# Patient Record
Sex: FEMALE | Race: BLACK OR AFRICAN AMERICAN | URBAN - METROPOLITAN AREA
[De-identification: names, ages, dates, MRNs, and addresses within clinical notes are randomized per-mention and may not be internally consistent; named-entity substitution may affect disease eponyms.]

---

## 2017-03-21 RX ORDER — COLCHICINE 0.6 MG/1
0.6 TABLET ORAL DAILY
Qty: 30 TAB | Refills: 11 | Status: SHIPPED | OUTPATIENT
Start: 2017-03-21 | End: 2017-03-22 | Stop reason: SDUPTHER

## 2017-03-22 RX ORDER — COLCHICINE 0.6 MG/1
0.6 TABLET ORAL DAILY
Qty: 30 TAB | Refills: 11 | Status: SHIPPED | OUTPATIENT
Start: 2017-03-22 | End: 2019-02-12 | Stop reason: SDUPTHER

## 2017-03-27 ENCOUNTER — OFFICE VISIT (OUTPATIENT)
Dept: INTERNAL MEDICINE CLINIC | Age: 77
End: 2017-03-27

## 2017-03-27 VITALS
WEIGHT: 100.7 LBS | HEIGHT: 63 IN | OXYGEN SATURATION: 95 % | SYSTOLIC BLOOD PRESSURE: 133 MMHG | DIASTOLIC BLOOD PRESSURE: 69 MMHG | HEART RATE: 74 BPM | TEMPERATURE: 98 F | RESPIRATION RATE: 18 BRPM | BODY MASS INDEX: 17.84 KG/M2

## 2017-03-27 DIAGNOSIS — I10 ESSENTIAL HYPERTENSION: ICD-10-CM

## 2017-03-27 DIAGNOSIS — J44.9 CHRONIC OBSTRUCTIVE PULMONARY DISEASE, UNSPECIFIED COPD TYPE (HCC): Primary | ICD-10-CM

## 2017-03-27 DIAGNOSIS — E03.2 HYPOTHYROIDISM DUE TO MEDICATION: ICD-10-CM

## 2017-03-27 DIAGNOSIS — E78.5 DYSLIPIDEMIA: ICD-10-CM

## 2017-03-27 DIAGNOSIS — M10.9 GOUT, UNSPECIFIED CAUSE, UNSPECIFIED CHRONICITY, UNSPECIFIED SITE: ICD-10-CM

## 2017-03-27 NOTE — PROGRESS NOTES
[facility]  [place] [address]  [address] [place] [id number]  Phone:  [phone]  Fax: [phone]       Chief Complaint   Patient presents with    Hypertension   . SUBJECTIVE:    Tala Mendez is a 67 y.o. female comes in for return visit stating that she has done well. She could not get her Colchicine filled. I am not entirely sure why she is taking this on a daily basis other than obviously for gout but why this was settled upon. I suspect she was on Allopurinol and stopped taking it and continued taking the Colchicine. She has not had any attacks. Unfortunately she continues to smoke cigarettes. She has been taking her antihypertensive medication as prescribed as is the case with her statin in view of her increased cardiovascular risk. She is also compliant with her thyroid supplement. Current Outpatient Prescriptions   Medication Sig Dispense Refill    levothyroxine (SYNTHROID) 50 mcg tablet TAKE 1 TABLET ON AN EMPTY STOMACH IN THE MORNING ONCE A DAY ORALLY 30 Tab 11    pravastatin (PRAVACHOL) 40 mg tablet TAKE 1 TABLET AT BEDTIME 30 Tab 11    losartan (COZAAR) 50 mg tablet Take 1 Tab by mouth daily. 30 Tab 11    amLODIPine (NORVASC) 10 mg tablet Take 1 Tab by mouth daily. 30 Tab 11    allopurinol (ZYLOPRIM) 100 mg tablet Take 1 Tab by mouth daily. 30 Tab 11    colchicine 0.6 mg tablet Take 1 Tab by mouth daily.  27 Tab 11     Past Medical History:   Diagnosis Date    Hypercholesterolemia     Hypertension     Thyroid disease      Past Surgical History:   Procedure Laterality Date    HX COLONOSCOPY      HX GYN       No Known Allergies      REVIEW OF SYSTEMS:  General: negative for - chills or fever  ENT: negative for - headaches, nasal congestion or tinnitus  Respiratory: negative for - cough, hemoptysis, shortness of breath or wheezing  Cardiovascular : negative for - chest pain, edema, palpitations or shortness of breath  Gastrointestinal: negative for - abdominal pain, blood in stools, heartburn or nausea/vomiting  Genito-Urinary: no dysuria, trouble voiding, or hematuria  Musculoskeletal: negative for - gait disturbance, joint pain, joint stiffness or joint swelling  Neurological: no TIA or stroke symptoms  Hematologic: no bruises, no bleeding, no swollen glands  Integument: no lumps, mole changes, nail changes or rash  Endocrine: no malaise/lethargy or unexpected weight changes      Social History     Social History    Marital status: UNKNOWN     Spouse name: N/A    Number of children: 2    Years of education: N/A     Occupational History    retired--long-term work      Social History Main Topics    Smoking status: Current Every Day Smoker    Smokeless tobacco: Never Used    Alcohol use No    Drug use: No    Sexual activity: Not Currently     Other Topics Concern    None     Social History Narrative     Family History   Problem Relation Age of Onset    Cancer Paternal Grandfather        OBJECTIVE:    Visit Vitals    /69 (BP 1 Location: Left arm, BP Patient Position: Sitting)    Pulse 74    Temp 98 °F (36.7 °C) (Oral)    Resp 18    Ht 5' 3\" (1.6 m)    Wt 100 lb 11.2 oz (45.7 kg)    SpO2 95%    BMI 17.84 kg/m2     CONSTITUTIONAL: well , well nourished, appears age appropriate  EYES: perrla, eom intact  ENMT:moist mucous membranes, pharynx clear  NECK: supple. Thyroid normal  RESPIRATORY: Chest: clear to ascultation and percussion   CARDIOVASCULAR: Heart: regular rate and rhythm  GASTROINTESTINAL: Abdomen: soft, bowel sounds active  HEMATOLOGIC: no pathological lymph nodes palpated  MUSCULOSKELETAL: Extremities: no edema, pulse 1+   INTEGUMENT: No unusual rashes or suspicious skin lesions noted. Nails appear normal.  NEUROLOGIC: non-focal exam   MENTAL STATUS: alert and oriented, appropriate affect      ASSESSMENT:  1. Chronic obstructive pulmonary disease, unspecified COPD type ([facility] [facility]    2.  Essential hypertension    3. Dyslipidemia    4. Hypothyroidism due to medication    5. Gout, unspecified cause, unspecified chronicity, unspecified site        PLAN:    1. She has mild COPD and I encourage her to discontinue cigarette smoking. 2. Blood pressure is excellent today. No adjustments are made. 3. Her last ApoB level was reasonable. No adjustments are made for now. 4. Thyroid status will be assessed for efficacy and compliance. 5. I will decide about appropriate treatment for her presumed gout once I get a uric acid back. .  Orders Placed This Encounter    METABOLIC PANEL, BASIC    URIC ACID         Follow-up Disposition:  Return in about 4 months (around 7/27/2017).       Noemi Barnhart MD

## 2017-03-27 NOTE — MR AVS SNAPSHOT
Visit Information Date & Time Provider Department Dept. Phone Encounter #  
 3/27/2017  9:00 AM Patricia Henao MD [facility] [phone] [id number] Follow-up Instructions Return in about 4 months (around 7/27/2017). Upcoming Health Maintenance Date Due INFLUENZA AGE 9 TO ADULT 12/5/2017* Pneumococcal 65+ Low/Medium Risk (1 of 2 - PCV13) 12/7/2017* DTaP/Tdap/Td series (1 - Tdap) 12/7/2017* GLAUCOMA SCREENING Q2Y 6/15/2017 MEDICARE YEARLY EXAM 11/29/2017 *Topic was postponed. The date shown is not the original due date. Allergies as of 3/27/2017  Review Complete On: 3/27/2017 By: Osei Scott No Known Allergies Current Immunizations  Never Reviewed No immunizations on file. Not reviewed this visit You Were Diagnosed With   
  
 Codes Comments Chronic obstructive pulmonary disease, unspecified COPD type ([facility]    -  Primary ICD-10-CM: J44.9 ICD-9-CM: 903 Essential hypertension     ICD-10-CM: I10 
ICD-9-CM: 401.9 Dyslipidemia     ICD-10-CM: E78.5 ICD-9-CM: 272.4 Hypothyroidism due to medication     ICD-10-CM: E03.2 ICD-9-CM: 244.8, E980.5 Gout, unspecified cause, unspecified chronicity, unspecified site     ICD-10-CM: M10.9 ICD-9-CM: 274.9 Vitals BP Pulse Temp Resp Height(growth percentile) Weight(growth percentile) 133/69 (BP 1 Location: Left arm, BP Patient Position: Sitting) 74 98 °F (36.7 °C) (Oral) 18 5' 3\" (1.6 m) 100 lb 11.2 oz (45.7 kg) SpO2 BMI OB Status Smoking Status 95% 17.84 kg/m2 Postmenopausal Current Every Day Smoker BMI and BSA Data Body Mass Index Body Surface Area  
 17.84 kg/m 2 1.43 m 2 Preferred Pharmacy Pharmacy Name Phone [id number] [place], [address] Your Updated Medication List  
  
   
This list is accurate as of: 3/27/17 10:09 AM.  Always use your most recent med list. amLODIPine 10 mg tablet Commonly known as:  Noel Reena Take 1 Tab by mouth daily. colchicine 0.6 mg tablet Take 1 Tab by mouth daily. levothyroxine 50 mcg tablet Commonly known as:  SYNTHROID  
TAKE 1 TABLET ON AN EMPTY STOMACH IN THE MORNING ONCE A DAY ORALLY  
  
 losartan 50 mg tablet Commonly known as:  COZAAR Take 1 Tab by mouth daily. pravastatin 40 mg tablet Commonly known as:  PRAVACHOL  
TAKE 1 TABLET AT BEDTIME We Performed the Following METABOLIC PANEL, BASIC [22484 CPT(R)] URIC ACID W118933 CPT(R)] Follow-up Instructions Return in about 4 months (around 7/27/2017). Introducing [facility]! Tam Navarro introduces [employer] patient portal. Now you can access parts of your medical record, email your doctor's office, and request medication refills online. 1. In your internet browser, go to [email or web address] 2. Click on the First Time User? Click Here link in the Sign In box. You will see the New Member Sign Up page. 3. Enter your [employer] Access Code exactly as it appears below. You will not need to use this code after youve completed the sign-up process. If you do not sign up before the expiration date, you must request a new code. · [employer] Access Code: [email or web address] Expires: 6/25/2017 10:09 AM 
 
4. Enter the last four digits of your Social Security Number (xxxx) and Date of Birth (mm/dd/yyyy) as indicated and click Submit. You will be taken to the next sign-up page. 5. Create a "[employer]t ID. This will be your [employer] login ID and cannot be changed, so think of one that is secure and easy to remember. 6. Create a [employer] password. You can change your password at any time. 7. Enter your Password Reset Question and Answer. This can be used at a later time if you forget your password. 8. Enter your e-mail address.  You will receive e-mail notification when new information is available in [employer]. 9. Click Sign Up. You can now view and download portions of your medical record. 10. Click the Download Summary menu link to download a portable copy of your medical information. If you have questions, please visit the Frequently Asked Questions section of the [employer] website. Remember, [employer] is NOT to be used for urgent needs. For medical emergencies, dial 911. Now available from your iPhone and Android! Please provide this summary of care documentation to your next provider. Your primary care clinician is listed as Howard Reed. If you have any questions after today's visit, please call [phone].

## 2017-03-27 NOTE — PROGRESS NOTES
1. Have you been to the ER, urgent care clinic since your last visit? Hospitalized since your last visit? No    2. Have you seen or consulted any other health care providers outside of the [address] [place] [address] since your last visit? Include any pap smears or colon screening.  No

## 2017-03-28 LAB
BUN SERPL-MCNC: 10 MG/DL (ref 8–27)
BUN/CREAT SERPL: 7 (ref 11–26)
CALCIUM SERPL-MCNC: 10.5 MG/DL (ref 8.7–10.3)
CHLORIDE SERPL-SCNC: 104 MMOL/L (ref 96–106)
CO2 SERPL-SCNC: 22 MMOL/L (ref 18–29)
CREAT SERPL-MCNC: 1.38 MG/DL (ref 0.57–1)
GLUCOSE SERPL-MCNC: 109 MG/DL (ref 65–99)
POTASSIUM SERPL-SCNC: 5.4 MMOL/L (ref 3.5–5.2)
SODIUM SERPL-SCNC: 143 MMOL/L (ref 134–144)
URATE SERPL-MCNC: 7.1 MG/DL (ref 2.5–7.1)

## 2017-03-29 RX ORDER — ALLOPURINOL 100 MG/1
100 TABLET ORAL DAILY
Qty: 30 TAB | Refills: 11 | Status: SHIPPED | OUTPATIENT
Start: 2017-03-29 | End: 2018-09-05 | Stop reason: SDUPTHER

## 2017-07-10 ENCOUNTER — OFFICE VISIT (OUTPATIENT)
Dept: INTERNAL MEDICINE CLINIC | Age: 77
End: 2017-07-10

## 2017-07-10 VITALS
BODY MASS INDEX: 18.61 KG/M2 | TEMPERATURE: 97.7 F | WEIGHT: 105 LBS | DIASTOLIC BLOOD PRESSURE: 82 MMHG | OXYGEN SATURATION: 97 % | SYSTOLIC BLOOD PRESSURE: 161 MMHG | HEART RATE: 79 BPM | RESPIRATION RATE: 18 BRPM | HEIGHT: 63 IN

## 2017-07-10 DIAGNOSIS — R59.1 LYMPHADENOPATHY OF HEAD AND NECK: Primary | ICD-10-CM

## 2017-07-10 DIAGNOSIS — J44.9 CHRONIC OBSTRUCTIVE PULMONARY DISEASE, UNSPECIFIED COPD TYPE (HCC): ICD-10-CM

## 2017-07-10 DIAGNOSIS — E03.2 HYPOTHYROIDISM DUE TO MEDICATION: ICD-10-CM

## 2017-07-10 DIAGNOSIS — M10.9 GOUT, UNSPECIFIED CAUSE, UNSPECIFIED CHRONICITY, UNSPECIFIED SITE: ICD-10-CM

## 2017-07-10 DIAGNOSIS — I10 ESSENTIAL HYPERTENSION: ICD-10-CM

## 2017-07-10 DIAGNOSIS — E78.5 DYSLIPIDEMIA: ICD-10-CM

## 2017-07-10 NOTE — MR AVS SNAPSHOT
Visit Information Date & Time Provider Department Dept. Phone Encounter #  
 7/10/2017 10:30 AM Leland Bee MD [facility] [phone] [id number] Upcoming Health Maintenance Date Due  
 GLAUCOMA SCREENING Q2Y 6/15/2017 INFLUENZA AGE 9 TO ADULT 12/5/2017* Pneumococcal 65+ Low/Medium Risk (1 of 2 - PCV13) 12/7/2017* DTaP/Tdap/Td series (1 - Tdap) 12/7/2017* MEDICARE YEARLY EXAM 11/29/2017 *Topic was postponed. The date shown is not the original due date. Allergies as of 7/10/2017  Review Complete On: 7/10/2017 By: Evan Reyes No Known Allergies Current Immunizations  Never Reviewed No immunizations on file. Not reviewed this visit You Were Diagnosed With   
  
 Codes Comments Lymphadenopathy of head and neck    -  Primary ICD-10-CM: R59.1 ICD-9-CM: 076. 6 Chronic obstructive pulmonary disease, unspecified COPD type ([facility]     ICD-10-CM: J44.9 ICD-9-CM: 009 Essential hypertension     ICD-10-CM: I10 
ICD-9-CM: 401.9 Gout, unspecified cause, unspecified chronicity, unspecified site     ICD-10-CM: M10.9 ICD-9-CM: 274.9 Dyslipidemia     ICD-10-CM: E78.5 ICD-9-CM: 272.4 Hypothyroidism due to medication     ICD-10-CM: E03.2 ICD-9-CM: 244.8, E980.5 Vitals BP Pulse Temp Resp Height(growth percentile) Weight(growth percentile) 161/82 (BP 1 Location: Left arm, BP Patient Position: Sitting) 79 97.7 °F (36.5 °C) (Oral) 18 5' 3\" (1.6 m) 105 lb (47.6 kg) SpO2 BMI OB Status Smoking Status 97% 18.6 kg/m2 Postmenopausal Current Every Day Smoker BMI and BSA Data Body Mass Index Body Surface Area  
 18.6 kg/m 2 1.45 m 2 Preferred Pharmacy Pharmacy Name Phone [id number] [place], [address] Your Updated Medication List  
  
   
This list is accurate as of: 7/10/17 11:11 AM.  Always use your most recent med list.  
 allopurinol 100 mg tablet Commonly known as:  Evorn Slight Take 1 Tab by mouth daily. amLODIPine 10 mg tablet Commonly known as:  Ishan Couch Take 1 Tab by mouth daily. colchicine 0.6 mg tablet Take 1 Tab by mouth daily. levothyroxine 50 mcg tablet Commonly known as:  SYNTHROID  
TAKE 1 TABLET ON AN EMPTY STOMACH IN THE MORNING ONCE A DAY ORALLY  
  
 losartan 50 mg tablet Commonly known as:  COZAAR Take 1 Tab by mouth daily. pravastatin 40 mg tablet Commonly known as:  PRAVACHOL  
TAKE 1 TABLET AT BEDTIME We Performed the Following APOLIPOPROTEIN B B044316 CPT(R)] METABOLIC PANEL, BASIC [30547 CPT(R)] TSH 3RD GENERATION [11029 CPT(R)] To-Do List   
 07/17/2017 Imaging:  CT NECK SOFT TISSUE WO CONT Introducing [facility]! Carl Valadez introduces [employer] patient portal. Now you can access parts of your medical record, email your doctor's office, and request medication refills online. 1. In your internet browser, go to [email or web address] 2. Click on the First Time User? Click Here link in the Sign In box. You will see the New Member Sign Up page. 3. Enter your [employer] Access Code exactly as it appears below. You will not need to use this code after youve completed the sign-up process. If you do not sign up before the expiration date, you must request a new code. · [employer] Access Code: [email or web address] Expires: 10/8/2017 11:11 AM 
 
4. Enter the last four digits of your Social Security Number (xxxx) and Date of Birth (mm/dd/yyyy) as indicated and click Submit. You will be taken to the next sign-up page. 5. Create a [employer] ID. This will be your [employer] login ID and cannot be changed, so think of one that is secure and easy to remember. 6. Create a [employer] password. You can change your password at any time. 7. Enter your Password Reset Question and Answer.  This can be used at a later time if you forget your password. 8. Enter your e-mail address. You will receive e-mail notification when new information is available in [address] 9. Click Sign Up. You can now view and download portions of your medical record. 10. Click the Download Summary menu link to download a portable copy of your medical information. If you have questions, please visit the Frequently Asked Questions section of the [employer] website. Remember, [employer] is NOT to be used for urgent needs. For medical emergencies, dial 911. Now available from your iPhone and Android! Please provide this summary of care documentation to your next provider. Your primary care clinician is listed as Howard Reed. If you have any questions after today's visit, please call [phone].

## 2017-07-10 NOTE — PROGRESS NOTES
[address] [place] [address] and Primary Care  [place] [address]  [address] [place] [address]  Phone:  [phone]  Fax: [phone]       Chief Complaint   Patient presents with    Hypertension   . SUBJECTIVE:    Marylen Kuba is a 65 y.o. female comes in for return visit stating that she has done fairly well. Unfortunately she continues to smoke cigarettes. She had an episode of gout in her left knee several weeks ago but it was quite responsive to Indomethacin. She has a past history of primary hypertension, dyslipidemia, and hypothyroidism. Current Outpatient Prescriptions   Medication Sig Dispense Refill    allopurinol (ZYLOPRIM) 100 mg tablet Take 1 Tab by mouth daily. 30 Tab 11    colchicine 0.6 mg tablet Take 1 Tab by mouth daily. 30 Tab 11    levothyroxine (SYNTHROID) 50 mcg tablet TAKE 1 TABLET ON AN EMPTY STOMACH IN THE MORNING ONCE A DAY ORALLY 30 Tab 11    pravastatin (PRAVACHOL) 40 mg tablet TAKE 1 TABLET AT BEDTIME 30 Tab 11    losartan (COZAAR) 50 mg tablet Take 1 Tab by mouth daily. 30 Tab 11    amLODIPine (NORVASC) 10 mg tablet Take 1 Tab by mouth daily.  27 Tab 11     Past Medical History:   Diagnosis Date    Hypercholesterolemia     Hypertension     Thyroid disease      Past Surgical History:   Procedure Laterality Date    HX COLONOSCOPY      HX GYN       No Known Allergies      REVIEW OF SYSTEMS:  General: negative for - chills or fever  ENT: negative for - headaches, nasal congestion or tinnitus  Respiratory: negative for - cough, hemoptysis, shortness of breath or wheezing  Cardiovascular : negative for - chest pain, edema, palpitations or shortness of breath  Gastrointestinal: negative for - abdominal pain, blood in stools, heartburn or nausea/vomiting  Genito-Urinary: no dysuria, trouble voiding, or hematuria  Musculoskeletal: negative for - gait disturbance, joint pain, joint stiffness or joint swelling  Neurological: no TIA or stroke symptoms  Hematologic: no bruises, no bleeding, no swollen glands  Integument: no lumps, mole changes, nail changes or rash  Endocrine: no malaise/lethargy or unexpected weight changes      Social History     Social History    Marital status: UNKNOWN     Spouse name: N/A    Number of children: 2    Years of education: N/A     Occupational History    retired--prison work      Social History Main Topics    Smoking status: Current Every Day Smoker    Smokeless tobacco: Never Used    Alcohol use No    Drug use: No    Sexual activity: Not Currently     Other Topics Concern    None     Social History Narrative     Family History   Problem Relation Age of Onset    Cancer Paternal Grandfather        OBJECTIVE:    Visit Vitals    /82 (BP 1 Location: Left arm, BP Patient Position: Sitting)    Pulse 79    Temp 97.7 °F (36.5 °C) (Oral)    Resp 18    Ht 5' 3\" (1.6 m)    Wt 105 lb (47.6 kg)    SpO2 97%    BMI 18.6 kg/m2     CONSTITUTIONAL: well , well nourished, appears age appropriate  EYES: perrla, eom intact  ENMT:moist mucous membranes, pharynx clear  NECK: supple. Thyroid normal  RESPIRATORY: Chest: clear to ascultation and percussion   CARDIOVASCULAR: Heart: regular rate and rhythm  GASTROINTESTINAL: Abdomen: soft, bowel sounds active  HEMATOLOGIC: Moderate lymphadenopathy bilateral anterior cervical chain  MUSCULOSKELETAL: Extremities: no edema, pulse 1+   INTEGUMENT: No unusual rashes or suspicious skin lesions noted. Nails appear normal.  NEUROLOGIC: non-focal exam   MENTAL STATUS: alert and oriented, appropriate affect      ASSESSMENT:  1. Lymphadenopathy of head and neck    2. Chronic obstructive pulmonary disease, unspecified COPD type ([address]    3. Essential hypertension    4. Gout, unspecified cause, unspecified chronicity, unspecified site    5. Dyslipidemia    6. Hypothyroidism due to medication        PLAN:    1.  The patient has a moderate size lymph node enlargement in the right anterior cervical chain. I do not think this is significant but I cannot take any chances, particularly in view of her history of cigarette smoking. CT scan of her neck will be obtained just to be on the safe side. I do not find any obvious pathology in the oral region. 2. She does have significant COPD and I encourage her to reduce her cigarette smoking which she is doing. 3. Blood pressure is slightly elevated but on repeat reading it normalizes. 4. Gout is reasonably stable at this point. 5. She will continue her statin as prescribed and efficacy and compliance assessed. 6. She will als continue her thyroid supplement with adjustments based on today's value. .  Orders Placed This Encounter    CT NECK SOFT TISSUE WO CONT    METABOLIC PANEL, BASIC    TSH 3RD GENERATION    APOLIPOPROTEIN B    SPECIMEN STATUS REPORT         Follow-up Disposition:  Return in about 3 months (around 10/10/2017).       Ignacia David MD

## 2017-07-10 NOTE — PROGRESS NOTES
1. Have you been to the ER, urgent care clinic since your last visit? Hospitalized since your last visit? No    2. Have you seen or consulted any other health care providers outside of the [address] [place] [address] since your last visit? Include any pap smears or colon screening.  No

## 2017-07-11 ENCOUNTER — TELEPHONE (OUTPATIENT)
Dept: INTERNAL MEDICINE CLINIC | Age: 77
End: 2017-07-11

## 2017-07-11 LAB
APO B SERPL-MCNC: 63 MG/DL (ref 54–133)
BUN SERPL-MCNC: 14 MG/DL (ref 8–27)
BUN/CREAT SERPL: 10 (ref 12–28)
CALCIUM SERPL-MCNC: 10.2 MG/DL (ref 8.7–10.3)
CHLORIDE SERPL-SCNC: 100 MMOL/L (ref 96–106)
CO2 SERPL-SCNC: 20 MMOL/L (ref 18–29)
CREAT SERPL-MCNC: 1.36 MG/DL (ref 0.57–1)
GLUCOSE SERPL-MCNC: 101 MG/DL (ref 65–99)
POTASSIUM SERPL-SCNC: 4.7 MMOL/L (ref 3.5–5.2)
SODIUM SERPL-SCNC: 142 MMOL/L (ref 134–144)
SPECIMEN STATUS REPORT, ROLRST: NORMAL
TSH SERPL DL<=0.005 MIU/L-ACNC: 4.18 UIU/ML (ref 0.45–4.5)

## 2017-07-11 NOTE — TELEPHONE ENCOUNTER
I reached out to the patient to make her aware of this upcoming appointment, however I was unable to talk to her and she has no voicemail to leave a message.  Patient is scheduled to have a CT scan neck at [facility] on July 18,2017 at 9:30am.

## 2017-08-15 DIAGNOSIS — R59.1 LYMPHADENOPATHY OF HEAD AND NECK: ICD-10-CM

## 2017-10-16 ENCOUNTER — OFFICE VISIT (OUTPATIENT)
Dept: INTERNAL MEDICINE CLINIC | Age: 77
End: 2017-10-16

## 2017-10-16 VITALS
SYSTOLIC BLOOD PRESSURE: 172 MMHG | RESPIRATION RATE: 16 BRPM | TEMPERATURE: 97.6 F | DIASTOLIC BLOOD PRESSURE: 82 MMHG | HEIGHT: 63 IN | BODY MASS INDEX: 19.42 KG/M2 | OXYGEN SATURATION: 98 % | HEART RATE: 68 BPM | WEIGHT: 109.6 LBS

## 2017-10-16 DIAGNOSIS — J06.9 UPPER RESPIRATORY TRACT INFECTION, UNSPECIFIED TYPE: Primary | ICD-10-CM

## 2017-10-16 DIAGNOSIS — E78.5 DYSLIPIDEMIA: ICD-10-CM

## 2017-10-16 DIAGNOSIS — E03.2 HYPOTHYROIDISM DUE TO MEDICATION: ICD-10-CM

## 2017-10-16 DIAGNOSIS — I10 ESSENTIAL HYPERTENSION: ICD-10-CM

## 2017-10-16 DIAGNOSIS — J44.9 CHRONIC OBSTRUCTIVE PULMONARY DISEASE, UNSPECIFIED COPD TYPE (HCC): ICD-10-CM

## 2017-10-16 NOTE — PROGRESS NOTES
Chief Complaint   Patient presents with    COPD     3 month follow up      1. Have you been to the ER, urgent care clinic since your last visit? Hospitalized since your last visit? No    2. Have you seen or consulted any other health care providers outside of the [address] [place] [address] since your last visit? Include any pap smears or colon screening.  No

## 2017-10-16 NOTE — MR AVS SNAPSHOT
Visit Information Date & Time Provider Department Dept. Phone Encounter #  
 10/16/2017  9:45 AM Mansi Marroquin [facility] and [place] [address] [id number] Follow-up Instructions Return in about 4 months (around 2/16/2018). Upcoming Health Maintenance Date Due INFLUENZA AGE 9 TO ADULT 12/5/2017* Pneumococcal 65+ Low/Medium Risk (1 of 2 - PCV13) 12/7/2017* DTaP/Tdap/Td series (1 - Tdap) 12/7/2017* GLAUCOMA SCREENING Q2Y 1/16/2018* MEDICARE YEARLY EXAM 11/29/2017 *Topic was postponed. The date shown is not the original due date. Allergies as of 10/16/2017  Review Complete On: 10/16/2017 By: Maged Rucker No Known Allergies Current Immunizations  Never Reviewed No immunizations on file. Not reviewed this visit You Were Diagnosed With   
  
 Codes Comments Upper respiratory tract infection, unspecified type    -  Primary ICD-10-CM: J06.9 ICD-9-CM: 465.9 Chronic obstructive pulmonary disease, unspecified COPD type ([address]     ICD-10-CM: J44.9 ICD-9-CM: 843 Essential hypertension     ICD-10-CM: I10 
ICD-9-CM: 401.9 Dyslipidemia     ICD-10-CM: E78.5 ICD-9-CM: 272.4 Hypothyroidism due to medication     ICD-10-CM: E03.2 ICD-9-CM: 244.8, E980.5 Vitals BP Pulse Temp Resp Height(growth percentile) Weight(growth percentile) 172/82 (BP 1 Location: Right arm, BP Patient Position: Sitting) 68 97.6 °F (36.4 °C) (Oral) 16 5' 3\" (1.6 m) 109 lb 9.6 oz (49.7 kg) SpO2 BMI OB Status Smoking Status 98% 19.41 kg/m2 Postmenopausal Current Every Day Smoker Vitals History BMI and BSA Data Body Mass Index Body Surface Area  
 19.41 kg/m 2 1.49 m 2 Preferred Pharmacy Pharmacy Name Phone [id number] [place], [place] [address] Your Updated Medication List  
  
   
 This list is accurate as of: 10/16/17 11:39 AM.  Always use your most recent med list.  
  
  
  
  
 allopurinol 100 mg tablet Commonly known as:  Flaca Casa Take 1 Tab by mouth daily. amLODIPine 10 mg tablet Commonly known as:  Mary Alice Gables Take 1 Tab by mouth daily. colchicine 0.6 mg tablet Take 1 Tab by mouth daily. levothyroxine 50 mcg tablet Commonly known as:  SYNTHROID  
TAKE 1 TABLET ON AN EMPTY STOMACH IN THE MORNING ONCE A DAY ORALLY  
  
 losartan 50 mg tablet Commonly known as:  COZAAR Take 1 Tab by mouth daily. pravastatin 40 mg tablet Commonly known as:  PRAVACHOL  
TAKE 1 TABLET AT BEDTIME Follow-up Instructions Return in about 4 months (around 2/16/2018). Patient Instructions Claritin D12 1 tablet twice a day 7 AM and 3 PM----buy a box of 10 Introducing [facility] SERVICES! [employer] introduces Quidsi patient portal. Now you can access parts of your medical record, email your doctor's office, and request medication refills online. 1. In your internet browser, go to [email or web address] 2. Click on the First Time User? Click Here link in the Sign In box. You will see the New Member Sign Up page. 3. Enter your Quidsi Access Code exactly as it appears below. You will not need to use this code after youve completed the sign-up process. If you do not sign up before the expiration date, you must request a new code. · Quidsi Access Code: [email or web address] Expires: 1/14/2018 11:39 AM 
 
4. Enter the last four digits of your Social Security Number (xxxx) and Date of Birth (mm/dd/yyyy) as indicated and click Submit. You will be taken to the next sign-up page. 5. Create a Quidsi ID. This will be your Quidsi login ID and cannot be changed, so think of one that is secure and easy to remember. 6. Create a [employer] password. You can change your password at any time. 7. Enter your Password Reset Question and Answer. This can be used at a later time if you forget your password. 8. Enter your e-mail address. You will receive e-mail notification when new information is available in [address] 9. Click Sign Up. You can now view and download portions of your medical record. 10. Click the Download Summary menu link to download a portable copy of your medical information. If you have questions, please visit the Frequently Asked Questions section of the TrueInsider website. Remember, TrueInsider is NOT to be used for urgent needs. For medical emergencies, dial 911. Now available from your iPhone and Android! Please provide this summary of care documentation to your next provider. Your primary care clinician is listed as Howard Reed. If you have any questions after today's visit, please call [phone].

## 2017-10-16 NOTE — PROGRESS NOTES
[address] [place] [address] and Primary Care  [place] [address]  [address] [place] [id number]  Phone:  [phone]  Fax: [phone]       Chief Complaint   Patient presents with    COPD     3 month follow up    . SUBJECTIVE:    Malcom Bishop is a 65 y.o. female Comes in for return visit stating that she has done well other than developing upper respiratory symptoms, which consists of nasal congestion and sneezing. She has not been coughing much. This has been present now for the last three to four days. Current Outpatient Prescriptions   Medication Sig Dispense Refill    allopurinol (ZYLOPRIM) 100 mg tablet Take 1 Tab by mouth daily. 30 Tab 11    levothyroxine (SYNTHROID) 50 mcg tablet TAKE 1 TABLET ON AN EMPTY STOMACH IN THE MORNING ONCE A DAY ORALLY 30 Tab 11    pravastatin (PRAVACHOL) 40 mg tablet TAKE 1 TABLET AT BEDTIME 30 Tab 11    losartan (COZAAR) 50 mg tablet Take 1 Tab by mouth daily. 30 Tab 11    amLODIPine (NORVASC) 10 mg tablet Take 1 Tab by mouth daily. 30 Tab 11    colchicine 0.6 mg tablet Take 1 Tab by mouth daily.  27 Tab 11     Past Medical History:   Diagnosis Date    Hypercholesterolemia     Hypertension     Thyroid disease      Past Surgical History:   Procedure Laterality Date    HX COLONOSCOPY      HX GYN       No Known Allergies      REVIEW OF SYSTEMS:  General: negative for - chills or fever  ENT: negative for - headaches, nasal congestion or tinnitus  Respiratory: negative for - cough, hemoptysis, shortness of breath or wheezing  Cardiovascular : negative for - chest pain, edema, palpitations or shortness of breath  Gastrointestinal: negative for - abdominal pain, blood in stools, heartburn or nausea/vomiting  Genito-Urinary: no dysuria, trouble voiding, or hematuria  Musculoskeletal: negative for - gait disturbance, joint pain, joint stiffness or joint swelling  Neurological: no TIA or stroke symptoms  Hematologic: no bruises, no bleeding, no swollen glands  Integument: no lumps, mole changes, nail changes or rash  Endocrine: no malaise/lethargy or unexpected weight changes      Social History     Social History    Marital status: UNKNOWN     Spouse name: N/A    Number of children: 2    Years of education: N/A     Occupational History    retired--[employer] work      Social History Main Topics    Smoking status: Current Every Day Smoker    Smokeless tobacco: Never Used    Alcohol use No    Drug use: No    Sexual activity: Not Currently     Other Topics Concern    None     Social History Narrative     Family History   Problem Relation Age of Onset    Cancer Paternal Grandfather        OBJECTIVE:    Visit Vitals    /82 (BP 1 Location: Right arm, BP Patient Position: Sitting)    Pulse 68    Temp 97.6 °F (36.4 °C) (Oral)    Resp 16    Ht 5' 3\" (1.6 m)    Wt 109 lb 9.6 oz (49.7 kg)    SpO2 98%    BMI 19.41 kg/m2     CONSTITUTIONAL: well , well nourished, appears age appropriate  EYES: perrla, eom intact  ENMT:moist mucous membranes, pharynx clear  NECK: supple. Thyroid normal  RESPIRATORY: Chest: clear to ascultation and percussion   CARDIOVASCULAR: Heart: regular rate and rhythm  GASTROINTESTINAL: Abdomen: soft, bowel sounds active  HEMATOLOGIC: no pathological lymph nodes palpated  MUSCULOSKELETAL: Extremities: no edema, pulse 1+   INTEGUMENT: No unusual rashes or suspicious skin lesions noted. Nails appear normal.  NEUROLOGIC: non-focal exam   MENTAL STATUS: alert and oriented, appropriate affect      ASSESSMENT:  1. Upper respiratory tract infection, unspecified type    2. Chronic obstructive pulmonary disease, unspecified COPD type ([address]    3. Essential hypertension    4. Dyslipidemia    5. Hypothyroidism due to medication        PLAN:    1. She had an upper respiratory infection, predominantly manifesting itself as a rhinitis. I suggest Claritin D12 one tablet twice a day 7 a.m., and 3 p.m.   I explained to her that this will mariam within the next five days anyway. 2. She has existing COPD, which has not flared up frankly. I encourage her to discontinue cigarette smoking. 3. Blood pressure is excellent today. No adjustments are made. 4. She will continue statin as prescribed. The last Apo B level was excellent and there is no reason to repeat that today. 5. She will also continue her thyroid supplement. She is euthyroid based on the last TSH. I will not repeat this value today. Follow-up Disposition:  Return in about 4 months (around 2/16/2018).       Armando Rey MD

## 2017-11-16 RX ORDER — LOSARTAN POTASSIUM 50 MG/1
TABLET ORAL
Qty: 30 TAB | Refills: 11 | Status: SHIPPED | OUTPATIENT
Start: 2017-11-16 | End: 2018-09-05 | Stop reason: SDUPTHER

## 2018-02-19 ENCOUNTER — OFFICE VISIT (OUTPATIENT)
Dept: INTERNAL MEDICINE CLINIC | Age: 78
End: 2018-02-19

## 2018-02-19 VITALS
TEMPERATURE: 98.1 F | OXYGEN SATURATION: 100 % | BODY MASS INDEX: 18.64 KG/M2 | DIASTOLIC BLOOD PRESSURE: 78 MMHG | WEIGHT: 105.2 LBS | RESPIRATION RATE: 16 BRPM | HEART RATE: 85 BPM | SYSTOLIC BLOOD PRESSURE: 160 MMHG | HEIGHT: 63 IN

## 2018-02-19 DIAGNOSIS — Z00.00 WELLNESS EXAMINATION: ICD-10-CM

## 2018-02-19 DIAGNOSIS — I10 ESSENTIAL HYPERTENSION: ICD-10-CM

## 2018-02-19 DIAGNOSIS — J44.9 CHRONIC OBSTRUCTIVE PULMONARY DISEASE, UNSPECIFIED COPD TYPE (HCC): Primary | ICD-10-CM

## 2018-02-19 DIAGNOSIS — Z13.39 SCREENING FOR ALCOHOLISM: ICD-10-CM

## 2018-02-19 DIAGNOSIS — M10.9 GOUT, UNSPECIFIED CAUSE, UNSPECIFIED CHRONICITY, UNSPECIFIED SITE: ICD-10-CM

## 2018-02-19 DIAGNOSIS — E78.5 DYSLIPIDEMIA: ICD-10-CM

## 2018-02-19 DIAGNOSIS — E03.2 HYPOTHYROIDISM DUE TO MEDICATION: ICD-10-CM

## 2018-02-19 DIAGNOSIS — Z13.31 SCREENING FOR DEPRESSION: ICD-10-CM

## 2018-02-19 NOTE — MR AVS SNAPSHOT
Aakash Spencer 
 
 
 [address] [facility] [address] [address] 
[phone] Patient: Mihaela Kent MRN: [id number] RMV:[birth year] Visit Information Date & Time Provider Department Dept. Phone Encounter #  
 2/19/2018  9:15 AM Mansi Thompson [facility] and [place] [address] [id number] Follow-up Instructions Return in about 4 months (around 6/19/2018). Follow-up and Disposition History Upcoming Health Maintenance Date Due  
 GLAUCOMA SCREENING Q2Y 6/15/2017 MEDICARE YEARLY EXAM 11/29/2017 Pneumococcal 65+ Low/Medium Risk (2 of 2 - PPSV23) 2/19/2019 DTaP/Tdap/Td series (2 - Td) 2/19/2028 Allergies as of 2/19/2018  Review Complete On: 2/19/2018 By: Alvarado Adame MD  
 No Known Allergies Current Immunizations  Never Reviewed No immunizations on file. Not reviewed this visit You Were Diagnosed With   
  
 Codes Comments Chronic obstructive pulmonary disease, unspecified COPD type ([facility]    -  Primary ICD-10-CM: J44.9 ICD-9-CM: 376 Essential hypertension     ICD-10-CM: I10 
ICD-9-CM: 401.9 Dyslipidemia     ICD-10-CM: E78.5 ICD-9-CM: 272.4 Hypothyroidism due to medication     ICD-10-CM: E03.2 ICD-9-CM: 244.8, E980.5 Gout, unspecified cause, unspecified chronicity, unspecified site     ICD-10-CM: M10.9 ICD-9-CM: 274.9 Screening for alcoholism     ICD-10-CM: Z13.89 ICD-9-CM: V79.1 Screening for depression     ICD-10-CM: Z13.89 ICD-9-CM: V79.0 Wellness examination     ICD-10-CM: Z00.00 ICD-9-CM: V70.0 Vitals BP Pulse Temp Resp Height(growth percentile) Weight(growth percentile) 160/78 (BP 1 Location: Right arm, BP Patient Position: Sitting) 85 98.1 °F (36.7 °C) (Oral) 16 5' 3\" (1.6 m) 105 lb 3.2 oz (47.7 kg) SpO2 BMI OB Status Smoking Status 100% 18.64 kg/m2 Postmenopausal Current Every Day Smoker Vitals History BMI and BSA Data Body Mass Index Body Surface Area  
 18.64 kg/m 2 1.46 m 2 Preferred Pharmacy Pharmacy Name Phone [id number] Your Updated Medication List  
  
   
This list is accurate as of: 2/19/18 10:03 AM.  Always use your most recent med list.  
  
  
  
  
 allopurinol 100 mg tablet Commonly known as:  Hsobha Patrick Take 1 Tab by mouth daily. amLODIPine 10 mg tablet Commonly known as:  Carlos Nearing Take 1 Tab by mouth daily. colchicine 0.6 mg tablet Take 1 Tab by mouth daily. levothyroxine 50 mcg tablet Commonly known as:  SYNTHROID  
TAKE 1 TABLET ON AN EMPTY STOMACH IN THE MORNING ONCE A DAY ORALLY  
  
 losartan 50 mg tablet Commonly known as:  COZAAR  
TAKE 1 TAB BY MOUTH DAILY. pravastatin 40 mg tablet Commonly known as:  PRAVACHOL  
TAKE 1 TABLET AT BEDTIME We Performed the Following APOLIPOPROTEIN B D510169 CPT(R)] CBC WITH AUTOMATED DIFF [13530 CPT(R)] COLLECTION VENOUS BLOOD,VENIPUNCTURE E847552 CPT(R)] [address] [[id number] [facility]] LIPID PANEL [97015 CPT(R)] METABOLIC PANEL, COMPREHENSIVE [06395 CPT(R)] SC ANNUAL ALCOHOL SCREEN 15 MIN [id number] [facility]] REFERRAL TO OPHTHALMOLOGY [REF57 Custom] Comments:  
 Please evaluate patient for eye exam.  
 TSH 3RD GENERATION [73909 CPT(R)] URINALYSIS W/ RFLX MICROSCOPIC [34003 CPT(R)] Follow-up Instructions Return in about 4 months (around 6/19/2018). Referral Information Referral ID Referred By Referred To  
  
 [id number] Fatuma JOSHUA Not Available Visits Status Start Date End Date 1 New Request 2/19/18 2/19/19 If your referral has a status of pending review or denied, additional information will be sent to support the outcome of this decision. Patient Instructions Medicare Wellness Visit, Female The best way to live healthy is to have a healthy lifestyle by eating a well-balanced diet, exercising regularly, limiting alcohol and stopping smoking. Regular physical exams and screening tests are another way to keep healthy. Preventive exams provided by your health care provider can find health problems before they become diseases or illnesses. Preventive services including immunizations, screening tests, monitoring and exams can help you take care of your own health. All people over age 72 should have a pneumovax  and and a prevnar shot to prevent pneumonia. These are once in a lifetime unless you and your provider decide differently. All people over 65 should have a yearly flu shot and a tetanus vaccine every 10 years. A bone mass density to screen for osteoporosis or thinning of the bones should be done every 2 years after 65. Screening for diabetes mellitus with a blood sugar test should be done every year. Glaucoma is a disease of the eye due to increased ocular pressure that can lead to blindness and it should be done every year by an eye professional. 
 
Cardiovascular screening tests that check for elevated lipids (fatty part of blood) which can lead to heart disease and strokes should be done every 5 years. Colorectal screening that evaluates for blood or polyps in your colon should be done yearly as a stool test or every five years as a flexible sigmoidoscope or every 10 years as a colonoscopy up to age 76. Breast cancer screening with a mammogram is recommended biennially  for women age 54-69. Screening for cervical cancer with a pap smear and pelvic exam is recommended for women after age 72 years every 2 years up to age 79 or when the provider and patient decide to stop. If there is a history of cervical abnormalities or other increased risk for cancer then the test is recommended yearly.  
 
Hepatitis C screening is also recommended for anyone born between [birth year] through 1965. A shingles vaccine is also recommended once in a lifetime after age 61. Your Medicare Wellness Exam is recommended annually. Here is a list of your current Health Maintenance items with a due date: 
Health Maintenance Due Topic Date Due  Glaucoma Screening   06/15/2017 Niya Face Annual Well Visit  11/29/2017 Introducing [facility]! [facility] introduces Mobile [employer] patient portal. Now you can access parts of your medical record, email your doctor's office, and request medication refills online. 1. In your internet browser, go to [email or web address] 2. Click on the First Time User? Click Here link in the Sign In box. You will see the New Member Sign Up page. 3. Enter your [employer] Access Code exactly as it appears below. You will not need to use this code after youve completed the sign-up process. If you do not sign up before the expiration date, you must request a new code. · [employer] Access Code: [email or web address] Expires: 5/20/2018  9:18 AM 
 
4. Enter the last four digits of your Social Security Number (xxxx) and Date of Birth (mm/dd/yyyy) as indicated and click Submit. You will be taken to the next sign-up page. 5. Create a [employer]or ID. This will be your [employer] login ID and cannot be changed, so think of one that is secure and easy to remember. 6. Create a [employer] password. You can change your password at any time. 7. Enter your Password Reset Question and Answer. This can be used at a later time if you forget your password. 8. Enter your e-mail address. You will receive e-mail notification when new information is available in [address] 9. Click Sign Up. You can now view and download portions of your medical record. 10. Click the Download Summary menu link to download a portable copy of your medical information.  
 
If you have questions, please visit the Frequently Asked Questions section of the Mover. Remember, Drive [employer]OY[employer] is NOT to be used for urgent needs. For medical emergencies, dial 911. Now available from your iPhone and Android! Please provide this summary of care documentation to your next provider. Your primary care clinician is listed as Howard Reed. If you have any questions after today's visit, please call [phone].

## 2018-02-19 NOTE — PATIENT INSTRUCTIONS

## 2018-02-19 NOTE — ASSESSMENT & PLAN NOTE
Uncontrolled, based on history, physical exam and review of pertinent labs, studies and medications; meds reconciled; lifestyle modifications recommended. Key COPD Medications     Patient is on no COPD/Asthma meds.         Lab Results   Component Value Date/Time    WBC 4.1 11/28/2016 10:09 AM    HGB 13.8 11/28/2016 10:09 AM    HCT 40.9 11/28/2016 10:09 AM    PLATELET 774 04/53/6307 10:09 AM

## 2018-02-19 NOTE — PROGRESS NOTES
[address] [place] [address] and Primary Care  [facility]  [address] [place] [address]  Phone:  [phone]  Fax: [phone]       Chief Complaint   Patient presents with   [facility] Wellness Visit   . SUBJECTIVE:    Kelly Herman is a 65 y.o. female   Comes in for a follow up. Unfortunately, she continues to smoke cigarettes, almost half a pack per day. We talked about this at length. Her weight has been reasonably stable. I explained to her that her weight would increase if she discontinues cigarette smoking. She has an increased cardiovascular risk and therefore remains on Pravastatin which she has been for years. She has a past history of primary hypertension and gout. Fortunately, she has not had any gouty attacks of late primarily because of her compliance with Allopurinol. She remains physically active. MedDATA/gwo            Current Outpatient Prescriptions   Medication Sig Dispense Refill    losartan (COZAAR) 50 mg tablet TAKE 1 TAB BY MOUTH DAILY. 30 Tab 11    allopurinol (ZYLOPRIM) 100 mg tablet Take 1 Tab by mouth daily. 30 Tab 11    levothyroxine (SYNTHROID) 50 mcg tablet TAKE 1 TABLET ON AN EMPTY STOMACH IN THE MORNING ONCE A DAY ORALLY 30 Tab 11    pravastatin (PRAVACHOL) 40 mg tablet TAKE 1 TABLET AT BEDTIME 30 Tab 11    amLODIPine (NORVASC) 10 mg tablet Take 1 Tab by mouth daily. 30 Tab 11    colchicine 0.6 mg tablet Take 1 Tab by mouth daily.  27 Tab 11     Past Medical History:   Diagnosis Date    Hypercholesterolemia     Hypertension     Thyroid disease      Past Surgical History:   Procedure Laterality Date    HX COLONOSCOPY      HX GYN       No Known Allergies      REVIEW OF SYSTEMS:  General: negative for - chills or fever  ENT: negative for - headaches, nasal congestion or tinnitus  Respiratory: negative for - cough, hemoptysis, shortness of breath or wheezing  Cardiovascular : negative for - chest pain, edema, palpitations or shortness of breath  Gastrointestinal: negative for - abdominal pain, blood in stools, heartburn or nausea/vomiting  Genito-Urinary: no dysuria, trouble voiding, or hematuria  Musculoskeletal: negative for - gait disturbance, joint pain, joint stiffness or joint swelling  Neurological: no TIA or stroke symptoms  Hematologic: no bruises, no bleeding, no swollen glands  Integument: no lumps, mole changes, nail changes or rash  Endocrine: no malaise/lethargy or unexpected weight changes      Social History     Social History    Marital status: UNKNOWN     Spouse name: N/A    Number of children: 2    Years of education: N/A     Occupational History    retired--[employer] work      Social History Main Topics    Smoking status: Current Every Day Smoker    Smokeless tobacco: Never Used    Alcohol use No    Drug use: No    Sexual activity: Not Currently     Other Topics Concern    None     Social History Narrative     Family History   Problem Relation Age of Onset    Cancer Paternal Grandfather        OBJECTIVE:    Visit Vitals    /78 (BP 1 Location: Right arm, BP Patient Position: Sitting)    Pulse 85    Temp 98.1 °F (36.7 °C) (Oral)    Resp 16    Ht 5' 3\" (1.6 m)    Wt 105 lb 3.2 oz (47.7 kg)    SpO2 100%    BMI 18.64 kg/m2     CONSTITUTIONAL: well , well nourished, appears age appropriate  EYES: perrla, eom intact  ENMT:moist mucous membranes, pharynx clear  NECK: supple. Thyroid normal  RESPIRATORY: Chest: Decreased breath sounds bilaterally  CARDIOVASCULAR: Heart: regular rate and rhythm  GASTROINTESTINAL: Abdomen: soft, bowel sounds active  HEMATOLOGIC: no pathological lymph nodes palpated  MUSCULOSKELETAL: Extremities: no edema, pulse 1+   INTEGUMENT: No unusual rashes or suspicious skin lesions noted. Nails appear normal.  NEUROLOGIC: non-focal exam   MENTAL STATUS: alert and oriented, appropriate affect      ASSESSMENT:  1. Chronic obstructive pulmonary disease, unspecified COPD type ([facility] [address]    2. Essential hypertension    3. Dyslipidemia    4. Hypothyroidism due to medication    5. Gout, unspecified cause, unspecified chronicity, unspecified site    6. Screening for alcoholism    7. Screening for depression    8. Wellness examination      Diagnoses and all orders for this visit:    1. Chronic obstructive pulmonary disease, unspecified COPD type ([facility]  Assessment & Plan:  Uncontrolled, based on history, physical exam and review of pertinent labs, studies and medications; meds reconciled; lifestyle modifications recommended. Key COPD Medications     Patient is on no COPD/Asthma meds. Lab Results   Component Value Date/Time    WBC 4.1 11/28/2016 10:09 AM    HGB 13.8 11/28/2016 10:09 AM    HCT 40.9 11/28/2016 10:09 AM    PLATELET 781 41/48/3559 10:09 AM         2. Essential hypertension  -     REFERRAL TO OPHTHALMOLOGY  -     CBC WITH AUTOMATED DIFF  -     COLLECTION VENOUS BLOOD,VENIPUNCTURE  -     METABOLIC PANEL, COMPREHENSIVE  -     URINALYSIS W/ RFLX MICROSCOPIC    3. Dyslipidemia  -     APOLIPOPROTEIN B  -     LIPID PANEL    4. Hypothyroidism due to medication  -     TSH 3RD GENERATION    5. Gout, unspecified cause, unspecified chronicity, unspecified site    6. Screening for alcoholism  -     Annual  Alcohol Screen 15 min ()    7. Screening for depression  -     Depression Screen Annual    8. Wellness examination        PLAN:    1. She does have COPD. I encouraged her to discontinue cigarette smoking. 2. Blood pressure is actually normal. I contemplated increasing her antihypertensive medication, but based on final readings this does not need to be done. 3. She will continue statin as prescribed. Efficacy and compliance will be assessed. 4. She will continue her Allopurinol. Fortunately, she has not had an attack in years. 5. She will also continue her thyroid supplement. Efficacy and compliance will be assessed. 6. I encouraged her to remain physically active.     MedDATA/gwo .  Orders Placed This Encounter    Depression Screen Annual    APOLIPOPROTEIN B    CBC WITH AUTOMATED DIFF    LIPID PANEL    METABOLIC PANEL, COMPREHENSIVE    TSH 3RD GENERATION    URINALYSIS W/ RFLX MICROSCOPIC    REFERRAL TO OPHTHALMOLOGY         Follow-up Disposition:  Return in about 4 months (around 6/19/2018). Cathy Garces MD      This is a Subsequent Medicare Annual Wellness Exam (AWV) (Performed 12 months after IPPE or effective date of Medicare Part B enrollment)    I have reviewed the patient's medical history in detail and updated the computerized patient record. History     Past Medical History:   Diagnosis Date    Hypercholesterolemia     Hypertension     Thyroid disease       Past Surgical History:   Procedure Laterality Date    HX COLONOSCOPY      HX GYN       Current Outpatient Prescriptions   Medication Sig Dispense Refill    losartan (COZAAR) 50 mg tablet TAKE 1 TAB BY MOUTH DAILY. 30 Tab 11    allopurinol (ZYLOPRIM) 100 mg tablet Take 1 Tab by mouth daily. 30 Tab 11    levothyroxine (SYNTHROID) 50 mcg tablet TAKE 1 TABLET ON AN EMPTY STOMACH IN THE MORNING ONCE A DAY ORALLY 30 Tab 11    pravastatin (PRAVACHOL) 40 mg tablet TAKE 1 TABLET AT BEDTIME 30 Tab 11    amLODIPine (NORVASC) 10 mg tablet Take 1 Tab by mouth daily. 30 Tab 11    colchicine 0.6 mg tablet Take 1 Tab by mouth daily.  [address] [address] Tab 11     No Known Allergies  Family History   Problem Relation Age of Onset    Cancer Paternal Grandfather      Social History   Substance Use Topics    Smoking status: Current Every Day Smoker    Smokeless tobacco: Never Used    Alcohol use No     Patient Active Problem List   Diagnosis Code    COPD (chronic obstructive pulmonary disease) ([facility] J44.9    Hypertension I10    Gout M10.9    Low back pain M54.5    Dyslipidemia E78.5    Hypothyroidism E03.9       Depression Risk Factor Screening:     PHQ over the last two weeks 2/19/2018   PHQ Not Done -   Little interest or pleasure in doing things Not at all   Feeling down, depressed or hopeless Not at all   Total Score PHQ 2 0     Alcohol Risk Factor Screening: You do not drink alcohol or very rarely. Functional Ability and Level of Safety:   Hearing Loss  Hearing is good. Activities of Daily Living  The home contains: no safety equipment. Patient does total self care    Fall Risk  Fall Risk Assessment, last 12 mths 2/19/2018   Able to walk? Yes   Fall in past 12 months? No   Fall with injury? -       Abuse Screen  Patient is not abused    Cognitive Screening   Evaluation of Cognitive Function:  Has your family/caregiver stated any concerns about your memory: no  Normal    Patient Care Team   Patient Care Team:  Ramriez Mann MD as PCP - General (Internal Medicine)    Assessment/Plan   Education and counseling provided:  Are appropriate based on today's review and evaluation    Diagnoses and all orders for this visit:    1. Chronic obstructive pulmonary disease, unspecified COPD type ([facility]  Assessment & Plan:  Uncontrolled, based on history, physical exam and review of pertinent labs, studies and medications; meds reconciled; lifestyle modifications recommended. Key COPD Medications     Patient is on no COPD/Asthma meds. Lab Results   Component Value Date/Time    WBC 4.1 11/28/2016 10:09 AM    HGB 13.8 11/28/2016 10:09 AM    HCT 40.9 11/28/2016 10:09 AM    PLATELET 312 14/67/0135 10:09 AM         2. Essential hypertension  -     REFERRAL TO OPHTHALMOLOGY  -     CBC WITH AUTOMATED DIFF  -     COLLECTION VENOUS BLOOD,VENIPUNCTURE  -     METABOLIC PANEL, COMPREHENSIVE  -     URINALYSIS W/ RFLX MICROSCOPIC    3. Dyslipidemia  -     APOLIPOPROTEIN B  -     LIPID PANEL    4. Hypothyroidism due to medication  -     TSH 3RD GENERATION    5. Gout, unspecified cause, unspecified chronicity, unspecified site    6. Screening for alcoholism  -     Annual  Alcohol Screen 15 min ()    7.  Screening for depression  - Depression Screen Annual    8.  Wellness examination      Health Maintenance Due   Topic Date Due    GLAUCOMA SCREENING Q2Y  06/15/2017    MEDICARE YEARLY EXAM  11/29/2017

## 2018-02-19 NOTE — PROGRESS NOTES
Chief Complaint   Patient presents with   [facility] Annual Wellness Visit     1. Have you been to the ER, urgent care clinic since your last visit? Hospitalized since your last visit? No    2. Have you seen or consulted any other health care providers outside of the [address] [place] [address] since your last visit? Include any pap smears or colon screening.  No

## 2018-02-27 LAB
ALBUMIN SERPL-MCNC: 5.1 G/DL (ref 3.5–4.8)
ALBUMIN/GLOB SERPL: 1.7 {RATIO} (ref 1.2–2.2)
ALP SERPL-CCNC: 94 IU/L (ref 39–117)
ALT SERPL-CCNC: 11 IU/L (ref 0–32)
APO B SERPL-MCNC: 83 MG/DL (ref 54–133)
APPEARANCE UR: ABNORMAL
AST SERPL-CCNC: 23 IU/L (ref 0–40)
BACTERIA #/AREA URNS HPF: ABNORMAL /[HPF]
BASOPHILS # BLD AUTO: 0 X10E3/UL (ref 0–0.2)
BASOPHILS NFR BLD AUTO: 0 %
BILIRUB SERPL-MCNC: 0.4 MG/DL (ref 0–1.2)
BILIRUB UR QL STRIP: NEGATIVE
BUN SERPL-MCNC: 17 MG/DL (ref 8–27)
BUN/CREAT SERPL: 14 (ref 12–28)
CALCIUM SERPL-MCNC: 10.6 MG/DL (ref 8.7–10.3)
CASTS URNS MICRO: ABNORMAL
CASTS URNS QL MICRO: PRESENT /LPF
CHLORIDE SERPL-SCNC: 103 MMOL/L (ref 96–106)
CHOLEST SERPL-MCNC: 247 MG/DL (ref 100–199)
CO2 SERPL-SCNC: 19 MMOL/L (ref 18–29)
COLOR UR: YELLOW
CREAT SERPL-MCNC: 1.22 MG/DL (ref 0.57–1)
EOSINOPHIL # BLD AUTO: 0.1 X10E3/UL (ref 0–0.4)
EOSINOPHIL NFR BLD AUTO: 2 %
EPI CELLS #/AREA URNS HPF: >10 /HPF
ERYTHROCYTE [DISTWIDTH] IN BLOOD BY AUTOMATED COUNT: 16.6 % (ref 12.3–15.4)
GFR SERPLBLD CREATININE-BSD FMLA CKD-EPI: 43 ML/MIN/1.73
GFR SERPLBLD CREATININE-BSD FMLA CKD-EPI: 49 ML/MIN/1.73
GLOBULIN SER CALC-MCNC: 3 G/DL (ref 1.5–4.5)
GLUCOSE SERPL-MCNC: 99 MG/DL (ref 65–99)
GLUCOSE UR QL: NEGATIVE
HCT VFR BLD AUTO: 39.6 % (ref 34–46.6)
HDLC SERPL-MCNC: 156 MG/DL
HGB BLD-MCNC: 13.3 G/DL (ref 11.1–15.9)
HGB UR QL STRIP: NEGATIVE
IMM GRANULOCYTES # BLD: 0 X10E3/UL (ref 0–0.1)
IMM GRANULOCYTES NFR BLD: 0 %
KETONES UR QL STRIP: ABNORMAL
LDLC SERPL CALC-MCNC: 74 MG/DL (ref 0–99)
LEUKOCYTE ESTERASE UR QL STRIP: NEGATIVE
LYMPHOCYTES # BLD AUTO: 1.4 X10E3/UL (ref 0.7–3.1)
LYMPHOCYTES NFR BLD AUTO: 31 %
MCH RBC QN AUTO: 31.2 PG (ref 26.6–33)
MCHC RBC AUTO-ENTMCNC: 33.6 G/DL (ref 31.5–35.7)
MCV RBC AUTO: 93 FL (ref 79–97)
MICRO URNS: ABNORMAL
MONOCYTES # BLD AUTO: 0.5 X10E3/UL (ref 0.1–0.9)
MONOCYTES NFR BLD AUTO: 11 %
MUCOUS THREADS URNS QL MICRO: PRESENT
NEUTROPHILS # BLD AUTO: 2.6 X10E3/UL (ref 1.4–7)
NEUTROPHILS NFR BLD AUTO: 56 %
NITRITE UR QL STRIP: NEGATIVE
PH UR STRIP: 5 [PH] (ref 5–7.5)
PLATELET # BLD AUTO: 344 X10E3/UL (ref 150–379)
POTASSIUM SERPL-SCNC: 5 MMOL/L (ref 3.5–5.2)
PROT SERPL-MCNC: 8.1 G/DL (ref 6–8.5)
PROT UR QL STRIP: ABNORMAL
RBC # BLD AUTO: 4.26 X10E6/UL (ref 3.77–5.28)
RBC #/AREA URNS HPF: ABNORMAL /HPF
SODIUM SERPL-SCNC: 143 MMOL/L (ref 134–144)
SP GR UR: 1.03 (ref 1–1.03)
TRIGL SERPL-MCNC: 85 MG/DL (ref 0–149)
TSH SERPL DL<=0.005 MIU/L-ACNC: 1.26 UIU/ML (ref 0.45–4.5)
UROBILINOGEN UR STRIP-MCNC: 0.2 MG/DL (ref 0.2–1)
VLDLC SERPL CALC-MCNC: 17 MG/DL (ref 5–40)
WBC # BLD AUTO: 4.6 X10E3/UL (ref 3.4–10.8)
WBC #/AREA URNS HPF: ABNORMAL /HPF

## 2018-07-02 ENCOUNTER — OFFICE VISIT (OUTPATIENT)
Dept: INTERNAL MEDICINE CLINIC | Age: 78
End: 2018-07-02

## 2018-07-02 VITALS
HEART RATE: 82 BPM | DIASTOLIC BLOOD PRESSURE: 75 MMHG | SYSTOLIC BLOOD PRESSURE: 145 MMHG | TEMPERATURE: 98.2 F | WEIGHT: 102.1 LBS | HEIGHT: 63 IN | BODY MASS INDEX: 18.09 KG/M2 | OXYGEN SATURATION: 95 % | RESPIRATION RATE: 14 BRPM

## 2018-07-02 DIAGNOSIS — I10 ESSENTIAL HYPERTENSION: ICD-10-CM

## 2018-07-02 DIAGNOSIS — M10.9 GOUT, UNSPECIFIED CAUSE, UNSPECIFIED CHRONICITY, UNSPECIFIED SITE: ICD-10-CM

## 2018-07-02 DIAGNOSIS — E03.2 HYPOTHYROIDISM DUE TO MEDICATION: ICD-10-CM

## 2018-07-02 DIAGNOSIS — E78.5 DYSLIPIDEMIA: ICD-10-CM

## 2018-07-02 DIAGNOSIS — J44.9 CHRONIC OBSTRUCTIVE PULMONARY DISEASE, UNSPECIFIED COPD TYPE (HCC): Primary | ICD-10-CM

## 2018-07-02 NOTE — PROGRESS NOTES
[address] [place] [address] and Primary Care  [place] [address]  [address] [place] [address]  Phone:  [phone]  Fax: [phone]       Chief Complaint   Patient presents with    COPD     4 month follow up    . SUBJECTIVE:    Gerardo Elias is a 65 y.o. female comes in for a return visit stating that she is doing well. She feels great and remains physically active. She goes to a [employer] at least four days a week and continues with excellent interaction with her peers. Unfortunately, she continues to smoke cigarettes. She has a past history of primary hypertension, dyslipidemia, and hypothyroidism as well as gout. All of these medical problems appear to be stable at this point. Current Outpatient Prescriptions   Medication Sig Dispense Refill    losartan (COZAAR) 50 mg tablet TAKE 1 TAB BY MOUTH DAILY. 30 Tab 11    allopurinol (ZYLOPRIM) 100 mg tablet Take 1 Tab by mouth daily. 30 Tab 11    levothyroxine (SYNTHROID) 50 mcg tablet TAKE 1 TABLET ON AN EMPTY STOMACH IN THE MORNING ONCE A DAY ORALLY 30 Tab 11    pravastatin (PRAVACHOL) 40 mg tablet TAKE 1 TABLET AT BEDTIME 30 Tab 11    amLODIPine (NORVASC) 10 mg tablet Take 1 Tab by mouth daily. 30 Tab 11    colchicine 0.6 mg tablet Take 1 Tab by mouth daily.  27 Tab 11     Past Medical History:   Diagnosis Date    Hypercholesterolemia     Hypertension     Thyroid disease      Past Surgical History:   Procedure Laterality Date    HX COLONOSCOPY      HX GYN       No Known Allergies      REVIEW OF SYSTEMS:  General: negative for - chills or fever  ENT: negative for - headaches, nasal congestion or tinnitus  Respiratory: negative for - cough, hemoptysis, shortness of breath or wheezing  Cardiovascular : negative for - chest pain, edema, palpitations or shortness of breath  Gastrointestinal: negative for - abdominal pain, blood in stools, heartburn or nausea/vomiting  Genito-Urinary: no dysuria, trouble voiding, or hematuria  Musculoskeletal: negative for - gait disturbance, joint pain, joint stiffness or joint swelling  Neurological: no TIA or stroke symptoms  Hematologic: no bruises, no bleeding, no swollen glands  Integument: no lumps, mole changes, nail changes or rash  Endocrine: no malaise/lethargy or unexpected weight changes      Social History     Social History    Marital status: UNKNOWN     Spouse name: N/A    Number of children: 2    Years of education: N/A     Occupational History    retired--[employer] work      Social History Main Topics    Smoking status: Current Every Day Smoker    Smokeless tobacco: Never Used    Alcohol use No    Drug use: No    Sexual activity: Not Currently     Other Topics Concern    None     Social History Narrative     Family History   Problem Relation Age of Onset    Cancer Paternal Grandfather        OBJECTIVE:    Visit Vitals    /75    Pulse 82    Temp 98.2 °F (36.8 °C) (Oral)    Resp 14    Ht 5' 3\" (1.6 m)    Wt 102 lb 1.6 oz (46.3 kg)    SpO2 95%    BMI 18.09 kg/m2     CONSTITUTIONAL: well , well nourished, appears age appropriate  EYES: perrla, eom intact  ENMT:moist mucous membranes, pharynx clear  NECK: supple. Thyroid normal  RESPIRATORY: Chest: clear to ascultation and percussion   CARDIOVASCULAR: Heart: regular rate and rhythm  GASTROINTESTINAL: Abdomen: soft, bowel sounds active  HEMATOLOGIC: no pathological lymph nodes palpated  MUSCULOSKELETAL: Extremities: no edema, pulse 1+   INTEGUMENT: No unusual rashes or suspicious skin lesions noted. Nails appear normal.  NEUROLOGIC: non-focal exam   MENTAL STATUS: alert and oriented, appropriate affect      ASSESSMENT:  1. Chronic obstructive pulmonary disease, unspecified COPD type ([address]    2. Essential hypertension    3. Dyslipidemia    4. Hypothyroidism due to medication    5. Gout, unspecified cause, unspecified chronicity, unspecified site        PLAN:    1.  I encouraged her to discontinue cigarette smoking. She does have mild COPD. 2. She will continue interaction with her colleagues and exercise on a consistent basis. 3. Her blood pressure today was 130/78. No adjustments are made. 4. She will continue her statin as prescribed in view of her increased cardiovascular risk. 5. She will also continue her thyroid supplement, and TSH will be checked. 6. She is up to date on all of her preventative items. Follow-up Disposition:  Return in about 6 months (around 1/2/2019).       Lynda Mosquera MD

## 2018-07-02 NOTE — PROGRESS NOTES
Chief Complaint   Patient presents with    COPD     4 month follow up      1. Have you been to the ER, urgent care clinic since your last visit? Hospitalized since your last visit? No    2. Have you seen or consulted any other health care providers outside of the [facility] since your last visit? Include any pap smears or colon screening.  No

## 2018-07-02 NOTE — MR AVS SNAPSHOT
[address] [facility] 
 
 
 [address] [phone] 
[phone] Patient: Mihaela Kent MRN: [id number] Visit Information Date & Time Provider Department Dept. Phone Encounter #  
 7/2/2018  9:15 AM [facility] [phone] [id number] Your Appointments 1/14/2019  9:00 AM  
Any with Monty Diaz MD  
[address] [place] [address] and Primary Care [facility] Appt Note: 6 MONTH FOLLOW UP  
 [address] [facility]  
  
   
 [address] [phone] Upcoming Health Maintenance Date Due  
 GLAUCOMA SCREENING Q2Y 6/15/2017 Influenza Age 5 to Adult 8/1/2018 Pneumococcal 65+ Low/Medium Risk (2 of 2 - PPSV23) 2/19/2019 MEDICARE YEARLY EXAM 2/20/2019 DTaP/Tdap/Td series (2 - Td) 2/19/2028 Allergies as of 7/2/2018  Review Complete On: 7/2/2018 By: Bhumika Claudio No Known Allergies Current Immunizations  Never Reviewed No immunizations on file. Not reviewed this visit You Were Diagnosed With   
  
 Codes Comments Chronic obstructive pulmonary disease, unspecified COPD type ([facility]    -  Primary ICD-10-CM: J44.9 ICD-9-CM: 458 Essential hypertension     ICD-10-CM: I10 
ICD-9-CM: 401.9 Dyslipidemia     ICD-10-CM: E78.5 ICD-9-CM: 272.4 Hypothyroidism due to medication     ICD-10-CM: E03.2 ICD-9-CM: 244.8, E980.5 Gout, unspecified cause, unspecified chronicity, unspecified site     ICD-10-CM: M10.9 ICD-9-CM: 274.9 Vitals BP Pulse Temp Resp Height(growth percentile) Weight(growth percentile) 145/75 82 98.2 °F (36.8 °C) (Oral) 14 5' 3\" (1.6 m) 102 lb 1.6 oz (46.3 kg) SpO2 BMI OB Status Smoking Status 95% 18.09 kg/m2 Postmenopausal Current Every Day Smoker Vitals History BMI and BSA Data Body Mass Index Body Surface Area 18.09 kg/m 2 1.43 m 2 Preferred Pharmacy Pharmacy Name Phone [id number]- [place], [address] Your Updated Medication List  
  
   
This list is accurate as of 7/2/18 10:22 AM.  Always use your most recent med list.  
  
  
  
  
 allopurinol 100 mg tablet Commonly known as:  Shahla Bibber Take 1 Tab by mouth daily. amLODIPine 10 mg tablet Commonly known as:  Rodger Rutwaldo Take 1 Tab by mouth daily. colchicine 0.6 mg tablet Take 1 Tab by mouth daily. levothyroxine 50 mcg tablet Commonly known as:  SYNTHROID  
TAKE 1 TABLET ON AN EMPTY STOMACH IN THE MORNING ONCE A DAY ORALLY  
  
 losartan 50 mg tablet Commonly known as:  COZAAR  
TAKE 1 TAB BY MOUTH DAILY. pravastatin 40 mg tablet Commonly known as:  PRAVACHOL  
TAKE 1 TABLET AT BEDTIME We Performed the Following APOLIPOPROTEIN B [id number] CPT(R)] METABOLIC PANEL, BASIC [13139 CPT(R)] TSH 3RD GENERATION [23414 CPT(R)] Introducing [facility] SERVICES! [employer] introduces netomat patient portal. Now you can access parts of your medical record, email your doctor's office, and request medication refills online. 1. In your internet browser, go to [email or web address] 2. Click on the First Time User? Click Here link in the Sign In box. You will see the New Member Sign Up page. 3. Enter your netomat Access Code exactly as it appears below. You will not need to use this code after youve completed the sign-up process. If you do not sign up before the expiration date, you must request a new code. · netomat Access Code: [email or web address] Expires: 9/30/2018 10:22 AM 
 
4. Enter the last four digits of your Social Security Number (xxxx) and Date of Birth (mm/dd/yyyy) as indicated and click Submit. You will be taken to the next sign-up page. 5. Create a netomat ID.  This will be your netomat login ID and cannot be changed, so think of one that is secure and easy to remember. 6. Create a [employer] password. You can change your password at any time. 7. Enter your Password Reset Question and Answer. This can be used at a later time if you forget your password. 8. Enter your e-mail address. You will receive e-mail notification when new information is available in [address] 9. Click Sign Up. You can now view and download portions of your medical record. 10. Click the Download Summary menu link to download a portable copy of your medical information. If you have questions, please visit the Frequently Asked Questions section of the [employer] website. Remember, [employer] is NOT to be used for urgent needs. For medical emergencies, dial 911. Now available from your iPhone and Android! Please provide this summary of care documentation to your next provider. Your primary care clinician is listed as Howard Reed. If you have any questions after today's visit, please call [phone].

## 2018-07-03 LAB
APO B SERPL-MCNC: 68 MG/DL (ref 54–133)
BUN SERPL-MCNC: 13 MG/DL (ref 8–27)
BUN/CREAT SERPL: 12 (ref 12–28)
CALCIUM SERPL-MCNC: 10.1 MG/DL (ref 8.7–10.3)
CHLORIDE SERPL-SCNC: 105 MMOL/L (ref 96–106)
CO2 SERPL-SCNC: 17 MMOL/L (ref 20–29)
CREAT SERPL-MCNC: 1.11 MG/DL (ref 0.57–1)
GLUCOSE SERPL-MCNC: 104 MG/DL (ref 65–99)
POTASSIUM SERPL-SCNC: 4.5 MMOL/L (ref 3.5–5.2)
SODIUM SERPL-SCNC: 143 MMOL/L (ref 134–144)
TSH SERPL DL<=0.005 MIU/L-ACNC: 2.82 UIU/ML (ref 0.45–4.5)

## 2018-08-08 RX ORDER — AMLODIPINE BESYLATE 10 MG/1
10 TABLET ORAL DAILY
Qty: 90 TAB | Refills: 3 | Status: SHIPPED | OUTPATIENT
Start: 2018-08-08 | End: 2019-08-07 | Stop reason: SDUPTHER

## 2018-08-08 RX ORDER — LEVOTHYROXINE SODIUM 50 UG/1
TABLET ORAL
Qty: 90 TAB | Refills: 3 | Status: SHIPPED | OUTPATIENT
Start: 2018-08-08 | End: 2019-08-07 | Stop reason: SDUPTHER

## 2018-09-05 RX ORDER — PRAVASTATIN SODIUM 40 MG/1
TABLET ORAL
Qty: 30 TAB | Refills: 11 | Status: SHIPPED | OUTPATIENT
Start: 2018-09-05 | End: 2019-08-07 | Stop reason: SDUPTHER

## 2018-09-05 RX ORDER — ALLOPURINOL 100 MG/1
100 TABLET ORAL DAILY
Qty: 30 TAB | Refills: 11 | Status: SHIPPED | OUTPATIENT
Start: 2018-09-05 | End: 2019-08-07 | Stop reason: SDUPTHER

## 2018-09-05 RX ORDER — LOSARTAN POTASSIUM 50 MG/1
TABLET ORAL
Qty: 30 TAB | Refills: 11 | Status: SHIPPED | OUTPATIENT
Start: 2018-09-05 | End: 2019-11-01 | Stop reason: SDUPTHER

## 2019-01-28 ENCOUNTER — OFFICE VISIT (OUTPATIENT)
Dept: INTERNAL MEDICINE CLINIC | Age: 79
End: 2019-01-28

## 2019-01-28 VITALS
WEIGHT: 100.9 LBS | TEMPERATURE: 97.8 F | HEART RATE: 77 BPM | HEIGHT: 63 IN | OXYGEN SATURATION: 96 % | BODY MASS INDEX: 17.88 KG/M2 | SYSTOLIC BLOOD PRESSURE: 136 MMHG | DIASTOLIC BLOOD PRESSURE: 76 MMHG | RESPIRATION RATE: 14 BRPM

## 2019-01-28 DIAGNOSIS — E03.2 HYPOTHYROIDISM DUE TO MEDICATION: ICD-10-CM

## 2019-01-28 DIAGNOSIS — J44.9 CHRONIC OBSTRUCTIVE PULMONARY DISEASE, UNSPECIFIED COPD TYPE (HCC): ICD-10-CM

## 2019-01-28 DIAGNOSIS — E78.5 DYSLIPIDEMIA: ICD-10-CM

## 2019-01-28 DIAGNOSIS — I10 ESSENTIAL HYPERTENSION: Primary | ICD-10-CM

## 2019-01-28 NOTE — PROGRESS NOTES
Chief Complaint Patient presents with  COPD  
  6 month follow up 1. Have you been to the ER, urgent care clinic since your last visit? Hospitalized since your last visit? No 
 
2. Have you seen or consulted any other health care providers outside of the [address] [place] [address] since your last visit? Include any pap smears or colon screening.  No

## 2019-01-29 LAB
ALBUMIN SERPL-MCNC: 4.8 G/DL (ref 3.5–4.8)
ALBUMIN/GLOB SERPL: 1.8 {RATIO} (ref 1.2–2.2)
ALP SERPL-CCNC: 78 IU/L (ref 39–117)
ALT SERPL-CCNC: 10 IU/L (ref 0–32)
APO B SERPL-MCNC: 65 MG/DL (ref 54–133)
APPEARANCE UR: CLEAR
AST SERPL-CCNC: 19 IU/L (ref 0–40)
BACTERIA #/AREA URNS HPF: NORMAL /[HPF]
BASOPHILS # BLD AUTO: 0 X10E3/UL (ref 0–0.2)
BASOPHILS NFR BLD AUTO: 0 %
BILIRUB SERPL-MCNC: 0.5 MG/DL (ref 0–1.2)
BILIRUB UR QL STRIP: NEGATIVE
BUN SERPL-MCNC: 11 MG/DL (ref 8–27)
BUN/CREAT SERPL: 10 (ref 12–28)
CALCIUM SERPL-MCNC: 10 MG/DL (ref 8.7–10.3)
CASTS URNS QL MICRO: NORMAL /LPF
CHLORIDE SERPL-SCNC: 105 MMOL/L (ref 96–106)
CHOLEST SERPL-MCNC: 238 MG/DL (ref 100–199)
CO2 SERPL-SCNC: 17 MMOL/L (ref 20–29)
COLOR UR: YELLOW
CREAT SERPL-MCNC: 1.05 MG/DL (ref 0.57–1)
EOSINOPHIL # BLD AUTO: 0 X10E3/UL (ref 0–0.4)
EOSINOPHIL NFR BLD AUTO: 1 %
EPI CELLS #/AREA URNS HPF: NORMAL /HPF
ERYTHROCYTE [DISTWIDTH] IN BLOOD BY AUTOMATED COUNT: 16.2 % (ref 12.3–15.4)
GLOBULIN SER CALC-MCNC: 2.7 G/DL (ref 1.5–4.5)
GLUCOSE SERPL-MCNC: 99 MG/DL (ref 65–99)
GLUCOSE UR QL: NEGATIVE
HCT VFR BLD AUTO: 41.3 % (ref 34–46.6)
HDLC SERPL-MCNC: 160 MG/DL
HGB BLD-MCNC: 13.8 G/DL (ref 11.1–15.9)
HGB UR QL STRIP: NEGATIVE
IMM GRANULOCYTES # BLD AUTO: 0 X10E3/UL (ref 0–0.1)
IMM GRANULOCYTES NFR BLD AUTO: 0 %
KETONES UR QL STRIP: ABNORMAL
LDLC SERPL CALC-MCNC: 61 MG/DL (ref 0–99)
LEUKOCYTE ESTERASE UR QL STRIP: NEGATIVE
LYMPHOCYTES # BLD AUTO: 1.2 X10E3/UL (ref 0.7–3.1)
LYMPHOCYTES NFR BLD AUTO: 28 %
MCH RBC QN AUTO: 33 PG (ref 26.6–33)
MCHC RBC AUTO-ENTMCNC: 33.4 G/DL (ref 31.5–35.7)
MCV RBC AUTO: 99 FL (ref 79–97)
MICRO URNS: ABNORMAL
MONOCYTES # BLD AUTO: 0.6 X10E3/UL (ref 0.1–0.9)
MONOCYTES NFR BLD AUTO: 14 %
MUCOUS THREADS URNS QL MICRO: PRESENT
NEUTROPHILS # BLD AUTO: 2.4 X10E3/UL (ref 1.4–7)
NEUTROPHILS NFR BLD AUTO: 57 %
NITRITE UR QL STRIP: NEGATIVE
PH UR STRIP: 5.5 [PH] (ref 5–7.5)
PLATELET # BLD AUTO: 269 X10E3/UL (ref 150–379)
POTASSIUM SERPL-SCNC: 4.5 MMOL/L (ref 3.5–5.2)
PROT SERPL-MCNC: 7.5 G/DL (ref 6–8.5)
PROT UR QL STRIP: ABNORMAL
RBC # BLD AUTO: 4.18 X10E6/UL (ref 3.77–5.28)
RBC #/AREA URNS HPF: NORMAL /HPF
SODIUM SERPL-SCNC: 143 MMOL/L (ref 134–144)
SP GR UR: 1.02 (ref 1–1.03)
TRIGL SERPL-MCNC: 85 MG/DL (ref 0–149)
TSH SERPL DL<=0.005 MIU/L-ACNC: 5.17 UIU/ML (ref 0.45–4.5)
UROBILINOGEN UR STRIP-MCNC: 0.2 MG/DL (ref 0.2–1)
VLDLC SERPL CALC-MCNC: 17 MG/DL (ref 5–40)
WBC # BLD AUTO: 4.2 X10E3/UL (ref 3.4–10.8)
WBC #/AREA URNS HPF: NORMAL /HPF

## 2019-01-31 NOTE — PROGRESS NOTES
[address] [place] [address] and Primary Care 
[place] [address] 
[address] [facility] [address] Phone:  [phone]  Fax: [phone] Chief Complaint Patient presents with  COPD  
  6 month follow up [place] SUBJECTIVE: 
  Edilia Hebert is a 77 y.o. female Comes in for return visit stating that she has done reasonably well. Unfortunately she continues to smoke cigarettes, which is her biggest problem. She is quite physically active. She has a past history of primary hypertension, dyslipidemia, hypothyroidism and gout. Current Outpatient Medications Medication Sig Dispense Refill  pravastatin (PRAVACHOL) 40 mg tablet TAKE 1 TABLET AT BEDTIME 30 Tab 11  
 allopurinol (ZYLOPRIM) 100 mg tablet Take 1 Tab by mouth daily. 30 Tab 11  
 losartan (COZAAR) 50 mg tablet TAKE 1 TAB BY MOUTH DAILY. 30 Tab 11  
 levothyroxine (SYNTHROID) 50 mcg tablet TAKE 1 TABLET ON AN EMPTY STOMACH IN THE MORNING ONCE A DAY ORALLY 90 Tab 3  
 amLODIPine (NORVASC) 10 mg tablet Take 1 Tab by mouth daily. 90 Tab 3  
 colchicine 0.6 mg tablet Take 1 Tab by mouth daily. 30 Tab 11 Past Medical History:  
Diagnosis Date  Hypercholesterolemia  Hypertension  Thyroid disease Past Surgical History:  
Procedure Laterality Date  HX COLONOSCOPY    
 HX GYN No Known Allergies REVIEW OF SYSTEMS: 
General: negative for - chills or fever ENT: negative for - headaches, nasal congestion or tinnitus Respiratory: negative for - cough, hemoptysis, shortness of breath or wheezing Cardiovascular : negative for - chest pain, edema, palpitations or shortness of breath Gastrointestinal: negative for - abdominal pain, blood in stools, heartburn or nausea/vomiting Genito-Urinary: no dysuria, trouble voiding, or hematuria Musculoskeletal: negative for - gait disturbance, joint pain, joint stiffness or joint swelling Neurological: no TIA or stroke symptoms Hematologic: no bruises, no bleeding, no swollen glands Integument: no lumps, mole changes, nail changes or rash Endocrine: no malaise/lethargy or unexpected weight changes Social History Socioeconomic History  Marital status: UNKNOWN Spouse name: Not on file  Number of children: 2  
 Years of education: Not on file  Highest education level: Not on file Occupational History  Occupation: retired--intermediate work Tobacco Use  Smoking status: Current Every Day Smoker  Smokeless tobacco: Never Used Substance and Sexual Activity  Alcohol use: No  
 Drug use: No  
 Sexual activity: Not Currently Family History Problem Relation Age of Onset  Cancer Paternal Grandfather OBJECTIVE: 
 
Visit Vitals /76 Pulse 77 Temp 97.8 °F (36.6 °C) (Oral) Resp 14 Ht 5' 3\" (1.6 m) Wt 100 lb 14.4 oz (45.8 kg) SpO2 96% BMI 17.87 kg/m² CONSTITUTIONAL: well , well nourished, appears age appropriate EYES: perrla, eom intact ENMT:moist mucous membranes, pharynx clear NECK: supple. Thyroid normal 
RESPIRATORY: Chest: clear to ascultation and percussion CARDIOVASCULAR: Heart: regular rate and rhythm GASTROINTESTINAL: Abdomen: soft, bowel sounds active HEMATOLOGIC: no pathological lymph nodes palpated MUSCULOSKELETAL: Extremities: no edema, pulse 1+ INTEGUMENT: No unusual rashes or suspicious skin lesions noted. Nails appear normal. 
NEUROLOGIC: non-focal exam  
MENTAL STATUS: alert and oriented, appropriate affect ASSESSMENT: 
1. Essential hypertension 2. Dyslipidemia 3. Hypothyroidism due to medication 4. Chronic obstructive pulmonary disease, unspecified COPD type ([address] PLAN: 
 
1. His blood pressure is excellent, no adjustments are made. 2. She will continue statin as prescribed in view of her significant increased cardiovascular risk created by her age, as well as her chronic cigarette smoking. 3. She will continue her thyroid supplement as prescribed and TSH will be assessed to determine efficacy and compliance. 4. Cigarette cessation is mandatory in view of her existing COPD and a proatherogenic state. . 
Orders Placed This Encounter  APOLIPOPROTEIN B  
 CBC WITH AUTOMATED DIFF  
 LIPID PANEL  
 METABOLIC PANEL, COMPREHENSIVE  
 TSH 3RD GENERATION  
 URINALYSIS W/ RFLX MICROSCOPIC  MICROSCOPIC EXAMINATION Follow-up Disposition: 
Return in about 6 months (around 7/28/2019).  
 
 
Elsy Watson MD

## 2019-02-13 RX ORDER — COLCHICINE 0.6 MG/1
CAPSULE ORAL
Qty: 30 CAP | Refills: 0 | Status: SHIPPED | OUTPATIENT
Start: 2019-02-13 | End: 2019-03-14 | Stop reason: SDUPTHER

## 2019-07-29 ENCOUNTER — OFFICE VISIT (OUTPATIENT)
Dept: INTERNAL MEDICINE CLINIC | Age: 79
End: 2019-07-29

## 2019-07-29 VITALS
TEMPERATURE: 97.8 F | DIASTOLIC BLOOD PRESSURE: 70 MMHG | BODY MASS INDEX: 17.56 KG/M2 | RESPIRATION RATE: 14 BRPM | WEIGHT: 99.1 LBS | HEART RATE: 77 BPM | SYSTOLIC BLOOD PRESSURE: 144 MMHG | OXYGEN SATURATION: 95 % | HEIGHT: 63 IN

## 2019-07-29 DIAGNOSIS — Z13.39 SCREENING FOR ALCOHOLISM: ICD-10-CM

## 2019-07-29 DIAGNOSIS — E03.2 HYPOTHYROIDISM DUE TO MEDICATION: ICD-10-CM

## 2019-07-29 DIAGNOSIS — I10 ESSENTIAL HYPERTENSION: ICD-10-CM

## 2019-07-29 DIAGNOSIS — J44.9 CHRONIC OBSTRUCTIVE PULMONARY DISEASE, UNSPECIFIED COPD TYPE (HCC): Primary | ICD-10-CM

## 2019-07-29 DIAGNOSIS — Z13.31 SCREENING FOR DEPRESSION: ICD-10-CM

## 2019-07-29 DIAGNOSIS — E78.5 DYSLIPIDEMIA: ICD-10-CM

## 2019-07-29 DIAGNOSIS — Z00.00 WELLNESS EXAMINATION: ICD-10-CM

## 2019-07-29 NOTE — PATIENT INSTRUCTIONS
Medicare Wellness Visit, Female     The best way to live healthy is to have a lifestyle where you eat a well-balanced diet, exercise regularly, limit alcohol use, and quit all forms of tobacco/nicotine, if applicable. Regular preventive services are another way to keep healthy. Preventive services (vaccines, screening tests, monitoring & exams) can help personalize your care plan, which helps you manage your own care. Screening tests can find health problems at the earliest stages, when they are easiest to treat. [facility] follows the current, evidence-based guidelines published by the [facility] Jaison Mayank ([facility]) when recommending preventive services for our patients. Because we follow these guidelines, sometimes recommendations change over time as research supports it. (For example, mammograms used to be recommended annually. Even though Medicare will still pay for an annual mammogram, the newer guidelines recommend a mammogram every two years for women of average risk.)  Of course, you and your doctor may decide to screen more often for some diseases, based on your risk and your health status. Preventive services for you include:  - Medicare offers their members a free annual wellness visit, which is time for you and your primary care provider to discuss and plan for your preventive service needs. Take advantage of this benefit every year!  -All adults over the age of 72 should receive the recommended pneumonia vaccines. Current USPSTF guidelines recommend a series of two vaccines for the best pneumonia protection.   -All adults should have a flu vaccine yearly and a tetanus vaccine every 10 years. All adults age 61 and older should receive a shingles vaccine once in their lifetime.    -A bone mass density test is recommended when a woman turns 65 to screen for osteoporosis. This test is only recommended one time, as a screening.  Some providers will use this same test as a disease monitoring tool if you already have osteoporosis. -All adults age 38-68 who are overweight should have a diabetes screening test once every three years.   -Other screening tests and preventive services for persons with diabetes include: an eye exam to screen for diabetic retinopathy, a kidney function test, a foot exam, and stricter control over your cholesterol.   -Cardiovascular screening for adults with routine risk involves an electrocardiogram (ECG) at intervals determined by your doctor.   -Colorectal cancer screenings should be done for adults age 54-65 with no increased risk factors for colorectal cancer. There are a number of acceptable methods of screening for this type of cancer. Each test has its own benefits and drawbacks. Discuss with your doctor what is most appropriate for you during your annual wellness visit. The different tests include: colonoscopy (considered the best screening method), a fecal occult blood test, a fecal DNA test, and sigmoidoscopy. -Breast cancer screenings are recommended every other year for women of normal risk, age 54-69.  -Cervical cancer screenings for women over age 72 are only recommended with certain risk factors.   -All adults born between [facility] should be screened once for Hepatitis C.      Here is a list of your current Health Maintenance items (your personalized list of preventive services) with a due date:  Health Maintenance Due   Topic Date Due    Glaucoma Screening   06/15/2017    Annual Well Visit  02/20/2019

## 2019-07-29 NOTE — PROGRESS NOTES
[address] [place] [address] and Primary Care  [place] [address]  [address] [place] [address]  Phone:  [phone]  Fax: [phone]       Chief Complaint   Patient presents with   [facility] Wellness Visit   . SUBJECTIVE:    Frankie Minor is a 77 y.o. female Comes in for return visit stating she has done well. Unfortunately she continues to smoke cigarettes. She does have existing COPD, but this is reasonably stable with no current symptoms. She has had no cardiovascular symptoms in spite of having primary hypertension and dyslipidemia. She has a past history of hypothyroidism. She remains reasonably active and will be having a birthday soon, at which time she will be [de-identified]years of age. She lives independently. Current Outpatient Medications   Medication Sig Dispense Refill    pravastatin (PRAVACHOL) 40 mg tablet TAKE 1 TABLET AT BEDTIME 30 Tab 11    allopurinol (ZYLOPRIM) 100 mg tablet Take 1 Tab by mouth daily. 30 Tab 11    losartan (COZAAR) 50 mg tablet TAKE 1 TAB BY MOUTH DAILY. 30 Tab 11    levothyroxine (SYNTHROID) 50 mcg tablet TAKE 1 TABLET ON AN EMPTY STOMACH IN THE MORNING ONCE A DAY ORALLY 90 Tab 3    amLODIPine (NORVASC) 10 mg tablet Take 1 Tab by mouth daily.  90 Tab 3    colchicine (MITIGARE) 0.6 mg capsule TAKE 1 TABLET BY MOUTH EVERY DAY ( NON FORM / NOT COVERED) 30 Cap 5     Past Medical History:   Diagnosis Date    Hypercholesterolemia     Hypertension     Thyroid disease      Past Surgical History:   Procedure Laterality Date    HX COLONOSCOPY      HX GYN       No Known Allergies      REVIEW OF SYSTEMS:  General: negative for - chills or fever  ENT: negative for - headaches, nasal congestion or tinnitus  Respiratory: negative for - cough, hemoptysis, shortness of breath or wheezing  Cardiovascular : negative for - chest pain, edema, palpitations or shortness of breath  Gastrointestinal: negative for - abdominal pain, blood in stools, heartburn or nausea/vomiting  Genito-Urinary: no dysuria, trouble voiding, or hematuria  Musculoskeletal: negative for - gait disturbance, joint pain, joint stiffness or joint swelling  Neurological: no TIA or stroke symptoms  Hematologic: no bruises, no bleeding, no swollen glands  Integument: no lumps, mole changes, nail changes or rash  Endocrine: no malaise/lethargy or unexpected weight changes      Social History     Socioeconomic History    Marital status: UNKNOWN     Spouse name: Not on file    Number of children: 2    Years of education: Not on file    Highest education level: Not on file   Occupational History    Occupation: retired--[employer] work   Tobacco Use    Smoking status: Current Every Day Smoker    Smokeless tobacco: Never Used   Substance and Sexual Activity    Alcohol use: No    Drug use: No    Sexual activity: Not Currently     Family History   Problem Relation Age of Onset    Cancer Paternal Grandfather        OBJECTIVE:    Visit Vitals  /70   Pulse 77   Temp 97.8 °F (36.6 °C) (Oral)   Resp 14   Ht 5' 3\" (1.6 m)   Wt 99 lb 1.6 oz (45 kg)   SpO2 95%   BMI 17.55 kg/m²     CONSTITUTIONAL: well , well nourished, appears age appropriate  EYES: perrla, eom intact  ENMT:moist mucous membranes, pharynx clear  NECK: supple. Thyroid normal  RESPIRATORY: Chest: clear to ascultation and percussion, decreased breath sounds bilaterally, increased AP diameter  CARDIOVASCULAR: Heart: regular rate and rhythm  GASTROINTESTINAL: Abdomen: soft, bowel sounds active  HEMATOLOGIC: no pathological lymph nodes palpated  MUSCULOSKELETAL: Extremities: no edema, pulse 1+   INTEGUMENT: No unusual rashes or suspicious skin lesions noted. Nails appear normal.  NEUROLOGIC: non-focal exam   MENTAL STATUS: alert and oriented, appropriate affect      ASSESSMENT:  1. Chronic obstructive pulmonary disease, unspecified COPD type ([facility] [address]    2. Essential hypertension    3. Hypothyroidism due to medication    4. Dyslipidemia    5. Screening for alcoholism    6. Screening for depression    7. Wellness examination        PLAN:    1. I again encouraged the patient to discontinue cigarette smoking. She is not really anxious to discontinue this habit that she has had it. I remind her of the adverse effects of this habit. 2. BP is reasonable, no adjustments are made. The slight increase is related to a sympathetic surge. 3. She will continue thyroid supplement as prescribed. Her last TSH was excellent. 4. She represents an individual with significant increased cardiovascular risk. She will maintain her statin usage. She is in a primary risk prevention mode. Follow-up and Dispositions    · Return in about 6 months (around 1/29/2020). Radha Ohara MD  This is the Subsequent Medicare Annual Wellness Exam, performed 12 months or more after the Initial AWV or the last Subsequent AWV    I have reviewed the patient's medical history in detail and updated the computerized patient record. History     Past Medical History:   Diagnosis Date    Hypercholesterolemia     Hypertension     Thyroid disease       Past Surgical History:   Procedure Laterality Date    HX COLONOSCOPY      HX GYN       Current Outpatient Medications   Medication Sig Dispense Refill    pravastatin (PRAVACHOL) 40 mg tablet TAKE 1 TABLET AT BEDTIME 30 Tab 11    allopurinol (ZYLOPRIM) 100 mg tablet Take 1 Tab by mouth daily. 30 Tab 11    losartan (COZAAR) 50 mg tablet TAKE 1 TAB BY MOUTH DAILY. 30 Tab 11    levothyroxine (SYNTHROID) 50 mcg tablet TAKE 1 TABLET ON AN EMPTY STOMACH IN THE MORNING ONCE A DAY ORALLY 90 Tab 3    amLODIPine (NORVASC) 10 mg tablet Take 1 Tab by mouth daily.  90 Tab 3    colchicine (MITIGARE) 0.6 mg capsule TAKE 1 TABLET BY MOUTH EVERY DAY ( NON FORM / NOT COVERED) 30 Cap 5     No Known Allergies  Family History   Problem Relation Age of Onset    Cancer Paternal Grandfather      Social History     Tobacco Use    Smoking status: Current Every Day Smoker    Smokeless tobacco: Never Used   Substance Use Topics    Alcohol use: No     Patient Active Problem List   Diagnosis Code    COPD (chronic obstructive pulmonary disease) ([facility] [address] J44.9    Hypertension I10    Gout M10.9    Low back pain M54.5    Dyslipidemia E78.5    Hypothyroidism E03.9       Depression Risk Factor Screening:     3 most recent PHQ Screens 1/28/2019   PHQ Not Done -   Little interest or pleasure in doing things Not at all   Feeling down, depressed, irritable, or hopeless Not at all   Total Score PHQ 2 0     Alcohol Risk Factor Screening: You do not drink alcohol or very rarely. Functional Ability and Level of Safety:   Hearing Loss  Hearing is good. Activities of Daily Living  The home contains: no safety equipment. Patient does total self care    Fall Risk  Fall Risk Assessment, last 12 mths 1/28/2019   Able to walk? Yes   Fall in past 12 months? No   Fall with injury? -       Abuse Screen  Patient is not abused    Cognitive Screening   Evaluation of Cognitive Function:  Has your family/caregiver stated any concerns about your memory: no  Normal    Patient Care Team   Patient Care Team:  Maddie Wahl MD as PCP - General (Internal Medicine)    Assessment/Plan   Education and counseling provided:  Are appropriate based on today's review and evaluation    Diagnoses and all orders for this visit:    1. Chronic obstructive pulmonary disease, unspecified COPD type ([facility] [address]    2. Essential hypertension  -     METABOLIC PANEL, BASIC    3. Hypothyroidism due to medication  -     TSH 3RD GENERATION    4. Dyslipidemia    5. Screening for alcoholism  -     ID ANNUAL ALCOHOL SCREEN 15 MIN    6. Screening for depression  -     DEPRESSION SCREEN ANNUAL    7.  Wellness examination        Health Maintenance Due   Topic Date Due    GLAUCOMA SCREENING Q2Y  06/15/2017    MEDICARE YEARLY EXAM  02/20/2019

## 2019-07-29 NOTE — PROGRESS NOTES
Chief Complaint   Patient presents with   Ginette Rider Annual Wellness Visit     1. Have you been to the ER, urgent care clinic since your last visit? Hospitalized since your last visit? No    2. Have you seen or consulted any other health care providers outside of the [address] [place] [address] since your last visit? Include any pap smears or colon screening.  No

## 2019-07-30 LAB
BUN SERPL-MCNC: 16 MG/DL (ref 8–27)
BUN/CREAT SERPL: 13 (ref 12–28)
CALCIUM SERPL-MCNC: 9.5 MG/DL (ref 8.7–10.3)
CHLORIDE SERPL-SCNC: 106 MMOL/L (ref 96–106)
CO2 SERPL-SCNC: 18 MMOL/L (ref 20–29)
CREAT SERPL-MCNC: 1.21 MG/DL (ref 0.57–1)
GLUCOSE SERPL-MCNC: 88 MG/DL (ref 65–99)
POTASSIUM SERPL-SCNC: 4.4 MMOL/L (ref 3.5–5.2)
SODIUM SERPL-SCNC: 144 MMOL/L (ref 134–144)
TSH SERPL DL<=0.005 MIU/L-ACNC: 1.29 UIU/ML (ref 0.45–4.5)

## 2019-08-07 RX ORDER — ALLOPURINOL 100 MG/1
TABLET ORAL
Qty: 90 TAB | Refills: 3 | Status: SHIPPED | OUTPATIENT
Start: 2019-08-07 | End: 2020-07-29

## 2019-08-07 RX ORDER — PRAVASTATIN SODIUM 40 MG/1
TABLET ORAL
Qty: 90 TAB | Refills: 3 | Status: SHIPPED | OUTPATIENT
Start: 2019-08-07 | End: 2020-07-29

## 2019-08-07 RX ORDER — AMLODIPINE BESYLATE 10 MG/1
TABLET ORAL
Qty: 90 TAB | Refills: 3 | Status: SHIPPED | OUTPATIENT
Start: 2019-08-07 | End: 2020-07-29

## 2019-08-07 RX ORDER — LEVOTHYROXINE SODIUM 50 UG/1
TABLET ORAL
Qty: 90 TAB | Refills: 3 | Status: SHIPPED | OUTPATIENT
Start: 2019-08-07 | End: 2020-07-29

## 2019-11-01 RX ORDER — LOSARTAN POTASSIUM 50 MG/1
TABLET ORAL
Qty: 90 TAB | Refills: 3 | Status: SHIPPED | OUTPATIENT
Start: 2019-11-01 | End: 2020-02-04 | Stop reason: CLARIF

## 2020-02-04 RX ORDER — VALSARTAN 160 MG/1
160 TABLET ORAL DAILY
Qty: 90 TAB | Refills: 3 | Status: SHIPPED | OUTPATIENT
Start: 2020-02-04 | End: 2021-01-23

## 2020-02-10 ENCOUNTER — OFFICE VISIT (OUTPATIENT)
Dept: INTERNAL MEDICINE CLINIC | Age: 80
End: 2020-02-10

## 2020-02-10 VITALS
DIASTOLIC BLOOD PRESSURE: 71 MMHG | OXYGEN SATURATION: 96 % | RESPIRATION RATE: 16 BRPM | WEIGHT: 100.4 LBS | HEART RATE: 71 BPM | BODY MASS INDEX: 17.79 KG/M2 | HEIGHT: 63 IN | SYSTOLIC BLOOD PRESSURE: 154 MMHG | TEMPERATURE: 98.2 F

## 2020-02-10 DIAGNOSIS — J44.9 CHRONIC OBSTRUCTIVE PULMONARY DISEASE, UNSPECIFIED COPD TYPE (HCC): ICD-10-CM

## 2020-02-10 DIAGNOSIS — E03.2 HYPOTHYROIDISM DUE TO MEDICATION: ICD-10-CM

## 2020-02-10 DIAGNOSIS — I10 ESSENTIAL HYPERTENSION: Primary | ICD-10-CM

## 2020-02-10 DIAGNOSIS — E78.5 DYSLIPIDEMIA: ICD-10-CM

## 2020-02-10 NOTE — PROGRESS NOTES
Chief Complaint   Patient presents with    Hypertension     6 month follow up      1. Have you been to the ER, urgent care clinic since your last visit? Hospitalized since your last visit? No    2. Have you seen or consulted any other health care providers outside of the [address] [place] [address] since your last visit? Include any pap smears or colon screening.  No

## 2020-02-10 NOTE — PROGRESS NOTES
[address] [place] [address] and Primary Care  [place] [address]  [address] [place] [address]  Phone:  [phone]  Fax: [phone]       Chief Complaint   Patient presents with    Hypertension     6 month follow up    . SUBJECTIVE:    Varun Abarca is a [de-identified] y.o. female Comes in for return visit stating that she has done well. She has no complaints. She is taking her medication as directed. There has been no meaningful weight gain. She continues to smoke cigarettes intermittently. She has a past history of primary hypertension, dyslipidemia and hypothyroidism. She remains physically active, going to the [employer] daily. Current Outpatient Medications   Medication Sig Dispense Refill    valsartan (DIOVAN) 160 mg tablet Take 1 Tab by mouth daily.  90 Tab 3    allopurinol (ZYLOPRIM) 100 mg tablet TAKE 1 TABLET BY MOUTH EVERY DAY 90 Tab 3    pravastatin (PRAVACHOL) 40 mg tablet TAKE 1 TABLET BY MOUTH EVERYDAY AT BEDTIME 90 Tab 3    levothyroxine (SYNTHROID) 50 mcg tablet TAKE 1 TABLET ON AN EMPTY STOMACH IN THE MORNING ONCE A DAY ORALLY 90 Tab 3    amLODIPine (NORVASC) 10 mg tablet TAKE 1 TABLET BY MOUTH EVERY DAY 90 Tab 3    colchicine (MITIGARE) 0.6 mg capsule TAKE 1 TABLET BY MOUTH EVERY DAY ( NON FORM / NOT COVERED) 30 Cap 5     Past Medical History:   Diagnosis Date    Hypercholesterolemia     Hypertension     Thyroid disease      Past Surgical History:   Procedure Laterality Date    HX COLONOSCOPY      HX GYN       No Known Allergies      REVIEW OF SYSTEMS:  General: negative for - chills or fever  ENT: negative for - headaches, nasal congestion or tinnitus  Respiratory: negative for - cough, hemoptysis, shortness of breath or wheezing  Cardiovascular : negative for - chest pain, edema, palpitations or shortness of breath  Gastrointestinal: negative for - abdominal pain, blood in stools, heartburn or nausea/vomiting  Genito-Urinary: no dysuria, trouble voiding, or hematuria  Musculoskeletal: negative for - gait disturbance, joint pain, joint stiffness or joint swelling  Neurological: no TIA or stroke symptoms  Hematologic: no bruises, no bleeding, no swollen glands  Integument: no lumps, mole changes, nail changes or rash  Endocrine: no malaise/lethargy or unexpected weight changes      Social History     Socioeconomic History    Marital status: UNKNOWN     Spouse name: Not on file    Number of children: 2    Years of education: Not on file    Highest education level: Not on file   Occupational History    Occupation: retired--long-term work   Tobacco Use    Smoking status: Current Every Day Smoker    Smokeless tobacco: Never Used   Substance and Sexual Activity    Alcohol use: No    Drug use: No    Sexual activity: Not Currently     Family History   Problem Relation Age of Onset    Cancer Paternal Grandfather        OBJECTIVE:    Visit Vitals  /71   Pulse 71   Temp 98.2 °F (36.8 °C) (Oral)   Resp 16   Ht 5' 3\" (1.6 m)   Wt 100 lb 6.4 oz (45.5 kg)   SpO2 96%   BMI 17.79 kg/m²     CONSTITUTIONAL: well , well nourished, appears age appropriate  EYES: perrla, eom intact  ENMT:moist mucous membranes, pharynx clear  NECK: supple. Thyroid normal  RESPIRATORY: Chest: clear to ascultation and percussion   CARDIOVASCULAR: Heart: regular rate and rhythm  GASTROINTESTINAL: Abdomen: soft, bowel sounds active  HEMATOLOGIC: no pathological lymph nodes palpated  MUSCULOSKELETAL: Extremities: no edema, pulse 1+   INTEGUMENT: No unusual rashes or suspicious skin lesions noted. Nails appear normal.  NEUROLOGIC: non-focal exam   MENTAL STATUS: alert and oriented, appropriate affect      ASSESSMENT:  1. Essential hypertension    2. Chronic obstructive pulmonary disease, unspecified COPD type ([facility] [address]    3. Dyslipidemia    4. Hypothyroidism due to medication        PLAN:    1. Her blood pressure is actually normal, no adjustments are made.   2. She does have COPD and needs to discontinue her cigarette smoking totally. She is aware of this and understands the implications of chronic cigarette abuse. 3. She will continue statin as prescribed. Efficacy and compliance will be assessed. 4. She will also continue her thyroid supplement and efficacy and compliance will also be assessed in this domain. 5. I encouraged her to increase her physical activity on a more consistent basis. .  Orders Placed This Encounter    APOLIPOPROTEIN B    CBC WITH AUTOMATED DIFF    LIPID PANEL    METABOLIC PANEL, COMPREHENSIVE    TSH 3RD GENERATION    URINALYSIS W/ RFLX MICROSCOPIC    MICROSCOPIC EXAMINATION         Follow-up and Dispositions    · Return in about 4 months (around 6/10/2020).            Blaise Rodríguez MD

## 2020-02-11 LAB
ALBUMIN SERPL-MCNC: 5.3 G/DL (ref 3.7–4.7)
ALBUMIN/GLOB SERPL: 1.8 {RATIO} (ref 1.2–2.2)
ALP SERPL-CCNC: 101 IU/L (ref 39–117)
ALT SERPL-CCNC: 13 IU/L (ref 0–32)
APO B SERPL-MCNC: 74 MG/DL
APPEARANCE UR: ABNORMAL
AST SERPL-CCNC: 19 IU/L (ref 0–40)
BACTERIA #/AREA URNS HPF: NORMAL /[HPF]
BASOPHILS # BLD AUTO: 0 X10E3/UL (ref 0–0.2)
BASOPHILS NFR BLD AUTO: 0 %
BILIRUB SERPL-MCNC: 0.4 MG/DL (ref 0–1.2)
BILIRUB UR QL STRIP: NEGATIVE
BUN SERPL-MCNC: 15 MG/DL (ref 8–27)
BUN/CREAT SERPL: 13 (ref 12–28)
CALCIUM SERPL-MCNC: 10.9 MG/DL (ref 8.7–10.3)
CASTS URNS QL MICRO: NORMAL /LPF
CHLORIDE SERPL-SCNC: 104 MMOL/L (ref 96–106)
CHOLEST SERPL-MCNC: 266 MG/DL (ref 100–199)
CO2 SERPL-SCNC: 16 MMOL/L (ref 20–29)
COLOR UR: YELLOW
COMMENT, 011824: ABNORMAL
CREAT SERPL-MCNC: 1.16 MG/DL (ref 0.57–1)
EOSINOPHIL # BLD AUTO: 0 X10E3/UL (ref 0–0.4)
EOSINOPHIL NFR BLD AUTO: 1 %
EPI CELLS #/AREA URNS HPF: NORMAL /HPF (ref 0–10)
ERYTHROCYTE [DISTWIDTH] IN BLOOD BY AUTOMATED COUNT: 14.2 % (ref 11.7–15.4)
GLOBULIN SER CALC-MCNC: 3 G/DL (ref 1.5–4.5)
GLUCOSE SERPL-MCNC: 93 MG/DL (ref 65–99)
GLUCOSE UR QL: NEGATIVE
HCT VFR BLD AUTO: 39.9 % (ref 34–46.6)
HDLC SERPL-MCNC: 17 MG/DL
HGB BLD-MCNC: 13.8 G/DL (ref 11.1–15.9)
HGB UR QL STRIP: NEGATIVE
IMM GRANULOCYTES # BLD AUTO: 0 X10E3/UL (ref 0–0.1)
IMM GRANULOCYTES NFR BLD AUTO: 0 %
KETONES UR QL STRIP: ABNORMAL
LDLC SERPL CALC-MCNC: 230 MG/DL (ref 0–99)
LEUKOCYTE ESTERASE UR QL STRIP: NEGATIVE
LYMPHOCYTES # BLD AUTO: 1.2 X10E3/UL (ref 0.7–3.1)
LYMPHOCYTES NFR BLD AUTO: 26 %
MCH RBC QN AUTO: 32.3 PG (ref 26.6–33)
MCHC RBC AUTO-ENTMCNC: 34.6 G/DL (ref 31.5–35.7)
MCV RBC AUTO: 93 FL (ref 79–97)
MICRO URNS: ABNORMAL
MONOCYTES # BLD AUTO: 0.5 X10E3/UL (ref 0.1–0.9)
MONOCYTES NFR BLD AUTO: 10 %
MUCOUS THREADS URNS QL MICRO: PRESENT
NEUTROPHILS # BLD AUTO: 2.8 X10E3/UL (ref 1.4–7)
NEUTROPHILS NFR BLD AUTO: 63 %
NITRITE UR QL STRIP: NEGATIVE
PH UR STRIP: 5 [PH] (ref 5–7.5)
PLATELET # BLD AUTO: 304 X10E3/UL (ref 150–450)
POTASSIUM SERPL-SCNC: 5.1 MMOL/L (ref 3.5–5.2)
PROT SERPL-MCNC: 8.3 G/DL (ref 6–8.5)
PROT UR QL STRIP: ABNORMAL
RBC # BLD AUTO: 4.27 X10E6/UL (ref 3.77–5.28)
RBC #/AREA URNS HPF: NORMAL /HPF (ref 0–2)
SODIUM SERPL-SCNC: 142 MMOL/L (ref 134–144)
SP GR UR: >=1.03 (ref 1–1.03)
TRIGL SERPL-MCNC: 95 MG/DL (ref 0–149)
TSH SERPL DL<=0.005 MIU/L-ACNC: 4.1 UIU/ML (ref 0.45–4.5)
UROBILINOGEN UR STRIP-MCNC: 1 MG/DL (ref 0.2–1)
VLDLC SERPL CALC-MCNC: 19 MG/DL (ref 5–40)
WBC # BLD AUTO: 4.5 X10E3/UL (ref 3.4–10.8)
WBC #/AREA URNS HPF: NORMAL /HPF (ref 0–5)

## 2020-06-18 ENCOUNTER — OFFICE VISIT (OUTPATIENT)
Dept: INTERNAL MEDICINE CLINIC | Age: 80
End: 2020-06-18

## 2020-06-18 VITALS
HEART RATE: 85 BPM | WEIGHT: 99.6 LBS | HEIGHT: 63 IN | RESPIRATION RATE: 14 BRPM | SYSTOLIC BLOOD PRESSURE: 136 MMHG | OXYGEN SATURATION: 97 % | TEMPERATURE: 97.9 F | DIASTOLIC BLOOD PRESSURE: 60 MMHG | BODY MASS INDEX: 17.65 KG/M2

## 2020-06-18 DIAGNOSIS — R80.9 PROTEINURIA, UNSPECIFIED TYPE: ICD-10-CM

## 2020-06-18 DIAGNOSIS — I10 ESSENTIAL HYPERTENSION: Primary | ICD-10-CM

## 2020-06-18 DIAGNOSIS — J44.9 CHRONIC OBSTRUCTIVE PULMONARY DISEASE, UNSPECIFIED COPD TYPE (HCC): ICD-10-CM

## 2020-06-18 DIAGNOSIS — E78.5 DYSLIPIDEMIA: ICD-10-CM

## 2020-06-18 DIAGNOSIS — E03.2 HYPOTHYROIDISM DUE TO MEDICATION: ICD-10-CM

## 2020-06-18 DIAGNOSIS — M10.9 GOUT, UNSPECIFIED CAUSE, UNSPECIFIED CHRONICITY, UNSPECIFIED SITE: ICD-10-CM

## 2020-06-18 NOTE — PROGRESS NOTES
Chief Complaint   Patient presents with    Hypertension     4 month follow up      1. Have you been to the ER, urgent care clinic since your last visit? Hospitalized since your last visit? No    2. Have you seen or consulted any other health care providers outside of the [address] [place] [address] since your last visit? Include any pap smears or colon screening.  No

## 2020-06-18 NOTE — PROGRESS NOTES
[address] [place] [address] and Primary Care  [place] [address]  [address] [facility] [address]  Phone:  [phone]  Fax: [phone]       Chief Complaint   Patient presents with    Hypertension     4 month follow up    . SUBJECTIVE:    Jeny Stewart is a [de-identified] y.o. female Comes in for return visit, stating that she is doing quite well. Unfortunately, she continues to smoke cigarettes. She has existing COPD, but is asymptomatic at this point. She is not that physically active. She is able to maintain her weight reasonably. She has a past history of primary hypertension, dyslipidemia, gout and hypothyroidism. Current Outpatient Medications   Medication Sig Dispense Refill    valsartan (DIOVAN) 160 mg tablet Take 1 Tab by mouth daily.  90 Tab 3    allopurinol (ZYLOPRIM) 100 mg tablet TAKE 1 TABLET BY MOUTH EVERY DAY 90 Tab 3    pravastatin (PRAVACHOL) 40 mg tablet TAKE 1 TABLET BY MOUTH EVERYDAY AT BEDTIME 90 Tab 3    levothyroxine (SYNTHROID) 50 mcg tablet TAKE 1 TABLET ON AN EMPTY STOMACH IN THE MORNING ONCE A DAY ORALLY 90 Tab 3    amLODIPine (NORVASC) 10 mg tablet TAKE 1 TABLET BY MOUTH EVERY DAY 90 Tab 3    colchicine (MITIGARE) 0.6 mg capsule TAKE 1 TABLET BY MOUTH EVERY DAY ( NON FORM / NOT COVERED) 30 Cap 5     Past Medical History:   Diagnosis Date    Hypercholesterolemia     Hypertension     Thyroid disease      Past Surgical History:   Procedure Laterality Date    HX COLONOSCOPY      HX GYN       No Known Allergies      REVIEW OF SYSTEMS:  General: negative for - chills or fever  ENT: negative for - headaches, nasal congestion or tinnitus  Respiratory: negative for - cough, hemoptysis, shortness of breath or wheezing  Cardiovascular : negative for - chest pain, edema, palpitations or shortness of breath  Gastrointestinal: negative for - abdominal pain, blood in stools, heartburn or nausea/vomiting  Genito-Urinary: no dysuria, trouble voiding, or hematuria  Musculoskeletal: negative for - gait disturbance, joint pain, joint stiffness or joint swelling  Neurological: no TIA or stroke symptoms  Hematologic: no bruises, no bleeding, no swollen glands  Integument: no lumps, mole changes, nail changes or rash  Endocrine: no malaise/lethargy or unexpected weight changes      Social History     Socioeconomic History    Marital status: UNKNOWN     Spouse name: Not on file    Number of children: 2    Years of education: Not on file    Highest education level: Not on file   Occupational History    Occupation: retired--jail work   Tobacco Use    Smoking status: Current Every Day Smoker    Smokeless tobacco: Never Used   Substance and Sexual Activity    Alcohol use: No    Drug use: No    Sexual activity: Not Currently     Family History   Problem Relation Age of Onset    Cancer Paternal Grandfather        OBJECTIVE:    Visit Vitals  /60   Pulse 85   Temp 97.9 °F (36.6 °C) (Oral)   Resp 14   Ht 5' 3\" (1.6 m)   Wt 99 lb 9.6 oz (45.2 kg)   SpO2 97%   BMI 17.64 kg/m²     CONSTITUTIONAL: well , well nourished, appears age appropriate  EYES: perrla, eom intact  ENMT:moist mucous membranes, pharynx clear  NECK: supple. Thyroid normal  RESPIRATORY: Chest: clear to ascultation and percussion   CARDIOVASCULAR: Heart: regular rate and rhythm  GASTROINTESTINAL: Abdomen: soft, bowel sounds active  HEMATOLOGIC: no pathological lymph nodes palpated  MUSCULOSKELETAL: Extremities: no edema, pulse 1+   INTEGUMENT: No unusual rashes or suspicious skin lesions noted. Nails appear normal.  NEUROLOGIC: non-focal exam   MENTAL STATUS: alert and oriented, appropriate affect      ASSESSMENT:  1. Essential hypertension    2. Hypothyroidism due to medication    3. Chronic obstructive pulmonary disease, unspecified COPD type ([facility] [address]    4. Dyslipidemia    5. Gout, unspecified cause, unspecified chronicity, unspecified site    6. Proteinuria, unspecified type        PLAN:    1. Blood pressure is excellent today, no adjustments are made. 2. She will continue her thyroid supplement as prescribed. The last TSH was reasonable. 3. She does have existing COPD and I again encouraged her to discontinue cigarette smoking. We talk about ways this can be done. Unfortunately, she is hopelessly addicted at [de-identified]years of age. 4. She will continue statin as prescribed and efficacy and compliance will be assessed as it relates to this. She has a high cardiovascular risk. 5. Her gout has been stable with no flare-ups. She continues with the Allopurinol. 6. She does have a mild proteinuria and I will quantitate this to ensure that there has been no progression. 7. I encouraged her to increase her physical activity by walking outside on a daily basis, weather permitting. .  Orders Placed This Encounter    PROT+CREATU (RANDOM)    METABOLIC PANEL, BASIC    APOLIPOPROTEIN B         Follow-up and Dispositions    · Return in about 4 months (around 10/18/2020).            Bello Hubbard MD

## 2020-06-19 LAB
APO B SERPL-MCNC: 55 MG/DL
BUN SERPL-MCNC: 13 MG/DL (ref 8–27)
BUN/CREAT SERPL: 10 (ref 12–28)
CALCIUM SERPL-MCNC: 10.1 MG/DL (ref 8.7–10.3)
CHLORIDE SERPL-SCNC: 105 MMOL/L (ref 96–106)
CO2 SERPL-SCNC: 19 MMOL/L (ref 20–29)
CREAT SERPL-MCNC: 1.33 MG/DL (ref 0.57–1)
CREAT UR-MCNC: 293.1 MG/DL
GLUCOSE SERPL-MCNC: 94 MG/DL (ref 65–99)
POTASSIUM SERPL-SCNC: 4.9 MMOL/L (ref 3.5–5.2)
PROT UR-MCNC: 62.1 MG/DL
PROT/CREAT UR: 212 MG/G CREAT (ref 0–200)
SODIUM SERPL-SCNC: 141 MMOL/L (ref 134–144)

## 2020-07-29 RX ORDER — AMLODIPINE BESYLATE 10 MG/1
TABLET ORAL
Qty: 90 TAB | Refills: 3 | Status: SHIPPED | OUTPATIENT
Start: 2020-07-29 | End: 2021-07-21

## 2020-07-29 RX ORDER — LEVOTHYROXINE SODIUM 50 UG/1
TABLET ORAL
Qty: 90 TAB | Refills: 3 | Status: SHIPPED | OUTPATIENT
Start: 2020-07-29 | End: 2021-07-21

## 2020-07-29 RX ORDER — ALLOPURINOL 100 MG/1
TABLET ORAL
Qty: 90 TAB | Refills: 3 | Status: SHIPPED | OUTPATIENT
Start: 2020-07-29 | End: 2021-07-17

## 2020-07-29 RX ORDER — PRAVASTATIN SODIUM 40 MG/1
TABLET ORAL
Qty: 90 TAB | Refills: 3 | Status: SHIPPED | OUTPATIENT
Start: 2020-07-29 | End: 2021-07-17

## 2020-10-19 ENCOUNTER — OFFICE VISIT (OUTPATIENT)
Dept: INTERNAL MEDICINE CLINIC | Age: 80
End: 2020-10-19
Payer: MEDICARE

## 2020-10-19 VITALS
HEIGHT: 63 IN | OXYGEN SATURATION: 96 % | WEIGHT: 96.7 LBS | SYSTOLIC BLOOD PRESSURE: 148 MMHG | TEMPERATURE: 97.7 F | BODY MASS INDEX: 17.13 KG/M2 | HEART RATE: 82 BPM | DIASTOLIC BLOOD PRESSURE: 76 MMHG | RESPIRATION RATE: 14 BRPM

## 2020-10-19 DIAGNOSIS — J43.1 PANLOBULAR EMPHYSEMA (HCC): ICD-10-CM

## 2020-10-19 DIAGNOSIS — R79.89 PRERENAL AZOTEMIA: ICD-10-CM

## 2020-10-19 DIAGNOSIS — E03.2 HYPOTHYROIDISM DUE TO MEDICATION: ICD-10-CM

## 2020-10-19 DIAGNOSIS — Z13.31 SCREENING FOR DEPRESSION: ICD-10-CM

## 2020-10-19 DIAGNOSIS — I10 ESSENTIAL HYPERTENSION: ICD-10-CM

## 2020-10-19 DIAGNOSIS — M79.652 PAIN OF LEFT THIGH: Primary | ICD-10-CM

## 2020-10-19 DIAGNOSIS — E78.5 DYSLIPIDEMIA: ICD-10-CM

## 2020-10-19 DIAGNOSIS — Z00.00 WELLNESS EXAMINATION: ICD-10-CM

## 2020-10-19 PROCEDURE — G8753 SYS BP > OR = 140: HCPCS | Performed by: INTERNAL MEDICINE

## 2020-10-19 PROCEDURE — 99215 OFFICE O/P EST HI 40 MIN: CPT | Performed by: INTERNAL MEDICINE

## 2020-10-19 PROCEDURE — G8399 PT W/DXA RESULTS DOCUMENT: HCPCS | Performed by: INTERNAL MEDICINE

## 2020-10-19 PROCEDURE — G8754 DIAS BP LESS 90: HCPCS | Performed by: INTERNAL MEDICINE

## 2020-10-19 PROCEDURE — G8536 NO DOC ELDER MAL SCRN: HCPCS | Performed by: INTERNAL MEDICINE

## 2020-10-19 PROCEDURE — G8432 DEP SCR NOT DOC, RNG: HCPCS | Performed by: INTERNAL MEDICINE

## 2020-10-19 PROCEDURE — 1101F PT FALLS ASSESS-DOCD LE1/YR: CPT | Performed by: INTERNAL MEDICINE

## 2020-10-19 PROCEDURE — 1090F PRES/ABSN URINE INCON ASSESS: CPT | Performed by: INTERNAL MEDICINE

## 2020-10-19 PROCEDURE — G8427 DOCREV CUR MEDS BY ELIG CLIN: HCPCS | Performed by: INTERNAL MEDICINE

## 2020-10-19 PROCEDURE — G8419 CALC BMI OUT NRM PARAM NOF/U: HCPCS | Performed by: INTERNAL MEDICINE

## 2020-10-19 PROCEDURE — G0439 PPPS, SUBSEQ VISIT: HCPCS | Performed by: INTERNAL MEDICINE

## 2020-10-19 NOTE — PATIENT INSTRUCTIONS
Medicare Wellness Visit, Female The best way to live healthy is to have a lifestyle where you eat a well-balanced diet, exercise regularly, limit alcohol use, and quit all forms of tobacco/nicotine, if applicable. Regular preventive services are another way to keep healthy. Preventive services (vaccines, screening tests, monitoring & exams) can help personalize your care plan, which helps you manage your own care. Screening tests can find health problems at the earliest stages, when they are easiest to treat. [address] follows the current, evidence-based guidelines published by the [facility] Jaison Mayank ([facility]) when recommending preventive services for our patients. Because we follow these guidelines, sometimes recommendations change over time as research supports it. (For example, mammograms used to be recommended annually. Even though Medicare will still pay for an annual mammogram, the newer guidelines recommend a mammogram every two years for women of average risk.) Of course, you and your doctor may decide to screen more often for some diseases, based on your risk and your health status. Preventive services for you include: - Medicare offers their members a free annual wellness visit, which is time for you and your primary care provider to discuss and plan for your preventive service needs. Take advantage of this benefit every year! 
-All adults over the age of 72 should receive the recommended pneumonia vaccines. Current USPSTF guidelines recommend a series of two vaccines for the best pneumonia protection.  
-All adults should have a flu vaccine yearly and a tetanus vaccine every 10 years. All adults age 48 and older should receive a shingles vaccine once in their lifetime.   
-A bone mass density test is recommended when a woman turns 65 to screen for osteoporosis. This test is only recommended one time, as a screening. Some providers will use this same test as a disease monitoring tool if you already have osteoporosis. -All adults age 38-68 who are overweight should have a diabetes screening test once every three years.  
-Other screening tests and preventive services for persons with diabetes include: an eye exam to screen for diabetic retinopathy, a kidney function test, a foot exam, and stricter control over your cholesterol.  
-Cardiovascular screening for adults with routine risk involves an electrocardiogram (ECG) at intervals determined by your doctor.  
-Colorectal cancer screenings should be done for adults age 54-65 with no increased risk factors for colorectal cancer. There are a number of acceptable methods of screening for this type of cancer. Each test has its own benefits and drawbacks. Discuss with your doctor what is most appropriate for you during your annual wellness visit. The different tests include: colonoscopy (considered the best screening method), a fecal occult blood test, a fecal DNA test, and sigmoidoscopy. -Breast cancer screenings are recommended every other year for women of normal risk, age 54-69. 
-Cervical cancer screenings for women over age 72 are only recommended with certain risk factors.  
-All adults born between [facility] should be screened once for Hepatitis C. Here is a list of your current Health Maintenance items (your personalized list of preventive services) with a due date: 
Health Maintenance Due Topic Date Due  Pneumococcal Vaccine (1 of 1 - PPSV23) 08/04/2004  Glaucoma Screening   06/15/2017 [facility] Annual Well Visit  07/29/2020  Yearly Flu Vaccine (1) 09/01/2020 Medicare Wellness Visit, Female The best way to live healthy is to have a lifestyle where you eat a well-balanced diet, exercise regularly, limit alcohol use, and quit all forms of tobacco/nicotine, if applicable. Regular preventive services are another way to keep healthy.  Preventive services (vaccines, screening tests, monitoring & exams) can help personalize your care plan, which helps you manage your own care. Screening tests can find health problems at the earliest stages, when they are easiest to treat. [facility] follows the current, evidence-based guidelines published by the [facility] Jaison Talley ([facility]) when recommending preventive services for our patients. Because we follow these guidelines, sometimes recommendations change over time as research supports it. (For example, mammograms used to be recommended annually. Even though Medicare will still pay for an annual mammogram, the newer guidelines recommend a mammogram every two years for women of average risk). Of course, you and your doctor may decide to screen more often for some diseases, based on your risk and your co-morbidities (chronic disease you are already diagnosed with). Preventive services for you include: - Medicare offers their members a free annual wellness visit, which is time for you and your primary care provider to discuss and plan for your preventive service needs. Take advantage of this benefit every year! 
-All adults over the age of 72 should receive the recommended pneumonia vaccines. Current USPSTF guidelines recommend a series of two vaccines for the best pneumonia protection.  
-All adults should have a flu vaccine yearly and a tetanus vaccine every 10 years.  
-All adults age 48 and older should receive the shingles vaccines (series of two vaccines). -All adults age 38-68 who are overweight should have a diabetes screening test once every three years.  
-All adults born between [birth year] and 1966 should be screened once for Hepatitis C. 
-Other screening tests and preventive services for persons with diabetes include: an eye exam to screen for diabetic retinopathy, a kidney function test, a foot exam, and stricter control over your cholesterol. -Cardiovascular screening for adults with routine risk involves an electrocardiogram (ECG) at intervals determined by your doctor.  
-Colorectal cancer screenings should be done for adults age 54-65 with no increased risk factors for colorectal cancer. There are a number of acceptable methods of screening for this type of cancer. Each test has its own benefits and drawbacks. Discuss with your doctor what is most appropriate for you during your annual wellness visit. The different tests include: colonoscopy (considered the best screening method), a fecal occult blood test, a fecal DNA test, and sigmoidoscopy. 
 
-A bone mass density test is recommended when a woman turns 65 to screen for osteoporosis. This test is only recommended one time, as a screening. Some providers will use this same test as a disease monitoring tool if you already have osteoporosis. -Breast cancer screenings are recommended every other year for women of normal risk, age 54-69. 
-Cervical cancer screenings for women over age 72 are only recommended with certain risk factors. Here is a list of your current Health Maintenance items (your personalized list of preventive services) with a due date: 
Health Maintenance Due Topic Date Due  Pneumococcal Vaccine (1 of 1 - PPSV23) 08/04/2004  Glaucoma Screening   06/15/2017 [facility] Annual Well Visit  07/29/2020  Yearly Flu Vaccine (1) 09/01/2020

## 2020-10-19 NOTE — PROGRESS NOTES
[address] [facility] and Primary Care  [place] [address]  [address] [place] [address]  Phone:  [phone]  Fax: [phone]       Chief Complaint   Patient presents with   [facility] Wellness Visit   . SUBJECTIVE:    Lon Draper is a 79 y.o. female Comes in for return visit complaining of a four day history of pain in her left thigh. She has had this before about 2-3 years ago and it was short lived. She has had no unaccustomed physical activity of late. It is actually getting better now. I should also mention she has not had a fall. Unfortunately, she continues to smoke cigarettes with her existing COPD. She has a past history of primary hypertension, dyslipidemia and hypothyroidism. Generally, she is reasonably physically active. Current Outpatient Medications   Medication Sig Dispense Refill    allopurinoL (ZYLOPRIM) 100 mg tablet TAKE 1 TABLET BY MOUTH EVERY DAY 90 Tab 3    amLODIPine (NORVASC) 10 mg tablet TAKE 1 TABLET BY MOUTH EVERY DAY 90 Tab 3    pravastatin (PRAVACHOL) 40 mg tablet TAKE 1 TABLET BY MOUTH EVERYDAY AT BEDTIME 90 Tab 3    levothyroxine (SYNTHROID) 50 mcg tablet TAKE 1 TABLET ON AN EMPTY STOMACH IN THE MORNING ONCE A DAY ORALLY 90 Tab 3    valsartan (DIOVAN) 160 mg tablet Take 1 Tab by mouth daily.  90 Tab 3    colchicine (MITIGARE) 0.6 mg capsule TAKE 1 TABLET BY MOUTH EVERY DAY ( NON FORM / NOT COVERED) 30 Cap 5     Past Medical History:   Diagnosis Date    Hypercholesterolemia     Hypertension     Thyroid disease      Past Surgical History:   Procedure Laterality Date    HX COLONOSCOPY      HX GYN       No Known Allergies      REVIEW OF SYSTEMS:  General: negative for - chills or fever  ENT: negative for - headaches, nasal congestion or tinnitus  Respiratory: negative for - cough, hemoptysis, shortness of breath or wheezing  Cardiovascular : negative for - chest pain, edema, palpitations or shortness of breath  Gastrointestinal: negative for - abdominal pain, blood in stools, heartburn or nausea/vomiting  Genito-Urinary: no dysuria, trouble voiding, or hematuria  Musculoskeletal: negative for - gait disturbance, joint pain, joint stiffness or joint swelling  Neurological: no TIA or stroke symptoms  Hematologic: no bruises, no bleeding, no swollen glands  Integument: no lumps, mole changes, nail changes or rash  Endocrine: no malaise/lethargy or unexpected weight changes      Social History     Socioeconomic History    Marital status: UNKNOWN     Spouse name: Not on file    Number of children: 2    Years of education: Not on file    Highest education level: Not on file   Occupational History    Occupation: retired--halfway work   Tobacco Use    Smoking status: Current Every Day Smoker    Smokeless tobacco: Never Used   Substance and Sexual Activity    Alcohol use: No    Drug use: No    Sexual activity: Not Currently     Family History   Problem Relation Age of Onset    Cancer Paternal Grandfather        OBJECTIVE:    Visit Vitals  BP (!) 148/76   Pulse 82   Temp 97.7 °F (36.5 °C) (Oral)   Resp 14   Ht 5' 3\" (1.6 m)   Wt 96 lb 11.2 oz (43.9 kg)   SpO2 96%   BMI 17.13 kg/m²     CONSTITUTIONAL: well , well nourished, appears age appropriate  EYES: perrla, eom intact  ENMT:moist mucous membranes, pharynx clear  NECK: supple. Thyroid normal  RESPIRATORY: Chest: clear to ascultation and percussion, increased AP diameter, decreased breath sounds bilaterally  CARDIOVASCULAR: Heart: regular rate and rhythm  GASTROINTESTINAL: Abdomen: soft, bowel sounds active  HEMATOLOGIC: no pathological lymph nodes palpated  MUSCULOSKELETAL: Extremities: no edema, pulse 1+, negative Jason's test left leg, no tenderness to palpation of the trochanteric bursa  INTEGUMENT: No unusual rashes or suspicious skin lesions noted.  Nails appear normal.  NEUROLOGIC: non-focal exam, negative straight leg raising left leg  MENTAL STATUS: alert and oriented, appropriate affect      ASSESSMENT:  1. Pain of left thigh    2. Essential hypertension    3. Hypothyroidism due to medication    4. Panlobular emphysema ([facility] [address]    5. Dyslipidemia    6. Prerenal azotemia    7. Screening for depression    8. Wellness examination        PLAN:    1. The patient's blood pressure is excellent, no adjustments are made. 2. She will continue her thyroid supplement as prescribed. TSH will be checked to assess efficacy and compliance. 3. I again encouraged her to discontinue cigarette smoking. She does have existing COPD. Unfortunately, she is not motivated to do so. 4. The pain she is having in her left thigh is a bit bothersome, although it is improving. Because of this, and the essentially negative examination, observation. If after a week she is no better, then more aggressive evaluation will be done with an x-ray. I see no reason to do that at this point because it is getting better and the exam was negative. 5. She has a significantly increased cardiovascular risk and remains on a statin. Her last particle count was reasonable. 6. She does have a prerenal azotemia and I will continue to monitor this to ensure that it is not getting any worse. .  Orders Placed This Encounter    Depression Screen Annual    METABOLIC PANEL, BASIC    TSH 3RD GENERATION         Follow-up and Dispositions    · Return in about 4 months (around 2/19/2021). Sang Dhaliwal MD  This is the Subsequent Medicare Annual Wellness Exam, performed 12 months or more after the Initial AWV or the last Subsequent AWV    I have reviewed the patient's medical history in detail and updated the computerized patient record.      History     Patient Active Problem List   Diagnosis Code    COPD (chronic obstructive pulmonary disease) ([facility] [address] J44.9    Hypertension I10    Gout M10.9    Low back pain M54.5    Dyslipidemia E78.5    Hypothyroidism E03.9    Prerenal azotemia R79.89     Past Medical History:   Diagnosis Date    Hypercholesterolemia     Hypertension     Thyroid disease       Past Surgical History:   Procedure Laterality Date    HX COLONOSCOPY      HX GYN       Current Outpatient Medications   Medication Sig Dispense Refill    allopurinoL (ZYLOPRIM) 100 mg tablet TAKE 1 TABLET BY MOUTH EVERY DAY 90 Tab 3    amLODIPine (NORVASC) 10 mg tablet TAKE 1 TABLET BY MOUTH EVERY DAY 90 Tab 3    pravastatin (PRAVACHOL) 40 mg tablet TAKE 1 TABLET BY MOUTH EVERYDAY AT BEDTIME 90 Tab 3    levothyroxine (SYNTHROID) 50 mcg tablet TAKE 1 TABLET ON AN EMPTY STOMACH IN THE MORNING ONCE A DAY ORALLY 90 Tab 3    valsartan (DIOVAN) 160 mg tablet Take 1 Tab by mouth daily. 90 Tab 3    colchicine (MITIGARE) 0.6 mg capsule TAKE 1 TABLET BY MOUTH EVERY DAY ( NON FORM / NOT COVERED) 30 Cap 5     No Known Allergies    Family History   Problem Relation Age of Onset    Cancer Paternal Grandfather      Social History     Tobacco Use    Smoking status: Current Every Day Smoker    Smokeless tobacco: Never Used   Substance Use Topics    Alcohol use: No       Depression Risk Factor Screening:     3 most recent PHQ Screens 2/10/2020   PHQ Not Done -   Little interest or pleasure in doing things Not at all   Feeling down, depressed, irritable, or hopeless Not at all   Total Score PHQ 2 0       Alcohol Risk Screen   Do you average more than 1 drink per night or more than 7 drinks a week:  No    On any one occasion in the past three months have you have had more than 3 drinks containing alcohol:  No        Functional Ability and Level of Safety:   Hearing: Hearing is good. Activities of Daily Living: The home contains: no safety equipment. Patient does total self care     Ambulation: with no difficulty     Fall Risk:  Fall Risk Assessment, last 12 mths 2/10/2020   Able to walk? Yes   Fall in past 12 months? No   Fall with injury?  -     Abuse Screen:  Patient is not abused       Cognitive Screening   Has your family/caregiver stated any concerns about your memory: no     Cognitive Screening: Not applicable    Patient Care Team   Patient Care Team:  Missael Quintanilla MD as PCP - General (Internal Medicine)  Missael Quintanilla MD as PCP - [facility]    Assessment/Plan   Education and counseling provided:  Are appropriate based on today's review and evaluation    Diagnoses and all orders for this visit:    1. Pain of left thigh    2. Essential hypertension    3. Hypothyroidism due to medication  -     TSH 3RD GENERATION    4. Panlobular emphysema ([address]    5. Dyslipidemia    6. Prerenal azotemia  -     METABOLIC PANEL, BASIC    7. Screening for depression  -     DEPRESSION SCREEN ANNUAL    8.  Wellness examination        Health Maintenance Due   Topic Date Due    Pneumococcal 65+ years (1 of 1 - PPSV23) 08/04/2004    GLAUCOMA SCREENING Q2Y  06/15/2017    Medicare Yearly Exam  07/29/2020    Flu Vaccine (1) 09/01/2020

## 2020-10-21 LAB
BUN SERPL-MCNC: 19 MG/DL (ref 8–27)
BUN/CREAT SERPL: 14 (ref 12–28)
CALCIUM SERPL-MCNC: 10 MG/DL (ref 8.7–10.3)
CHLORIDE SERPL-SCNC: 106 MMOL/L (ref 96–106)
CO2 SERPL-SCNC: 16 MMOL/L (ref 20–29)
CREAT SERPL-MCNC: 1.32 MG/DL (ref 0.57–1)
GLUCOSE SERPL-MCNC: 95 MG/DL (ref 65–99)
POTASSIUM SERPL-SCNC: 4.7 MMOL/L (ref 3.5–5.2)
SODIUM SERPL-SCNC: 140 MMOL/L (ref 134–144)
TSH SERPL DL<=0.005 MIU/L-ACNC: 4.37 UIU/ML (ref 0.45–4.5)

## 2021-01-23 RX ORDER — VALSARTAN 160 MG/1
TABLET ORAL
Qty: 90 TAB | Refills: 3 | Status: SHIPPED | OUTPATIENT
Start: 2021-01-23 | End: 2022-01-31 | Stop reason: SDUPTHER

## 2021-02-22 ENCOUNTER — OFFICE VISIT (OUTPATIENT)
Dept: INTERNAL MEDICINE CLINIC | Age: 81
End: 2021-02-22
Payer: MEDICARE

## 2021-02-22 VITALS
SYSTOLIC BLOOD PRESSURE: 138 MMHG | DIASTOLIC BLOOD PRESSURE: 76 MMHG | BODY MASS INDEX: 17.19 KG/M2 | HEIGHT: 63 IN | WEIGHT: 97 LBS | HEART RATE: 81 BPM | RESPIRATION RATE: 16 BRPM | OXYGEN SATURATION: 96 % | TEMPERATURE: 97.6 F

## 2021-02-22 DIAGNOSIS — J43.1 PANLOBULAR EMPHYSEMA (HCC): ICD-10-CM

## 2021-02-22 DIAGNOSIS — E03.2 HYPOTHYROIDISM DUE TO MEDICATION: ICD-10-CM

## 2021-02-22 DIAGNOSIS — R79.89 PRERENAL AZOTEMIA: ICD-10-CM

## 2021-02-22 DIAGNOSIS — I10 ESSENTIAL HYPERTENSION: Primary | ICD-10-CM

## 2021-02-22 DIAGNOSIS — E78.5 DYSLIPIDEMIA: ICD-10-CM

## 2021-02-22 PROCEDURE — 1101F PT FALLS ASSESS-DOCD LE1/YR: CPT | Performed by: INTERNAL MEDICINE

## 2021-02-22 PROCEDURE — 1090F PRES/ABSN URINE INCON ASSESS: CPT | Performed by: INTERNAL MEDICINE

## 2021-02-22 PROCEDURE — G8432 DEP SCR NOT DOC, RNG: HCPCS | Performed by: INTERNAL MEDICINE

## 2021-02-22 PROCEDURE — G8754 DIAS BP LESS 90: HCPCS | Performed by: INTERNAL MEDICINE

## 2021-02-22 PROCEDURE — G8427 DOCREV CUR MEDS BY ELIG CLIN: HCPCS | Performed by: INTERNAL MEDICINE

## 2021-02-22 PROCEDURE — G8419 CALC BMI OUT NRM PARAM NOF/U: HCPCS | Performed by: INTERNAL MEDICINE

## 2021-02-22 PROCEDURE — G8399 PT W/DXA RESULTS DOCUMENT: HCPCS | Performed by: INTERNAL MEDICINE

## 2021-02-22 PROCEDURE — G8536 NO DOC ELDER MAL SCRN: HCPCS | Performed by: INTERNAL MEDICINE

## 2021-02-22 PROCEDURE — G8752 SYS BP LESS 140: HCPCS | Performed by: INTERNAL MEDICINE

## 2021-02-22 PROCEDURE — 99215 OFFICE O/P EST HI 40 MIN: CPT | Performed by: INTERNAL MEDICINE

## 2021-02-22 NOTE — PROGRESS NOTES
Chief Complaint   Patient presents with    Hypertension     Patient is here for a follow up. 1. Have you been to the ER, urgent care clinic since your last visit? Hospitalized since your last visit? No    2. Have you seen or consulted any other health care providers outside of the [address] [place] [address] since your last visit? Include any pap smears or colon screening.  No

## 2021-02-22 NOTE — PROGRESS NOTES
[address] [facility]  [place] [address]  [address] [place] [address]  Phone:  [phone]  Fax: [phone]       Chief Complaint   Patient presents with    Hypertension     Patient is here for a follow up. .      SUBJECTIVE:    Susi Victor is a 79 y.o. female comes in for return visit stating that she has done reasonably well. Unfortunately, she continues to smoke cigarettes although not as intense as previously. She denies any pulmonary symptoms. She has a past history of primary hypertension, dyslipidemia, gout, and hypothyroidism. She is reasonably active. We also talked about the COVID vaccine at length.            Current Outpatient Medications   Medication Sig Dispense Refill    valsartan (DIOVAN) 160 mg tablet TAKE 1 TABLET BY MOUTH EVERY DAY 90 Tab 3    allopurinoL (ZYLOPRIM) 100 mg tablet TAKE 1 TABLET BY MOUTH EVERY DAY 90 Tab 3    amLODIPine (NORVASC) 10 mg tablet TAKE 1 TABLET BY MOUTH EVERY DAY 90 Tab 3    pravastatin (PRAVACHOL) 40 mg tablet TAKE 1 TABLET BY MOUTH EVERYDAY AT BEDTIME 90 Tab 3    levothyroxine (SYNTHROID) 50 mcg tablet TAKE 1 TABLET ON AN EMPTY STOMACH IN THE MORNING ONCE A DAY ORALLY 90 Tab 3    colchicine (MITIGARE) 0.6 mg capsule TAKE 1 TABLET BY MOUTH EVERY DAY ( NON FORM / NOT COVERED) 30 Cap 5     Past Medical History:   Diagnosis Date    Hypercholesterolemia     Hypertension     Thyroid disease      Past Surgical History:   Procedure Laterality Date    HX COLONOSCOPY      HX GYN       No Known Allergies      REVIEW OF SYSTEMS:  General: negative for - chills or fever  ENT: negative for - headaches, nasal congestion or tinnitus  Respiratory: negative for - cough, hemoptysis, shortness of breath or wheezing  Cardiovascular : negative for - chest pain, edema, palpitations or shortness of breath  Gastrointestinal: negative for - abdominal pain, blood in stools, heartburn or nausea/vomiting  Genito-Urinary: no dysuria, trouble voiding, or hematuria  Musculoskeletal: negative for - gait disturbance, joint pain, joint stiffness or joint swelling  Neurological: no TIA or stroke symptoms  Hematologic: no bruises, no bleeding, no swollen glands  Integument: no lumps, mole changes, nail changes or rash  Endocrine: no malaise/lethargy or unexpected weight changes      Social History     Socioeconomic History    Marital status: UNKNOWN     Spouse name: Not on file    Number of children: 2    Years of education: Not on file    Highest education level: Not on file   Occupational History    Occupation: retired--jail work   Tobacco Use    Smoking status: Current Every Day Smoker    Smokeless tobacco: Never Used   Substance and Sexual Activity    Alcohol use: No    Drug use: No    Sexual activity: Not Currently     Family History   Problem Relation Age of Onset    Cancer Paternal Grandfather        OBJECTIVE:    Visit Vitals  /76   Pulse 81   Temp 97.6 °F (36.4 °C)   Resp 16   Ht 5' 3\" (1.6 m)   Wt 97 lb (44 kg)   SpO2 96%   BMI 17.18 kg/m²     CONSTITUTIONAL: well , well nourished, appears age appropriate  EYES: perrla, eom intact  ENMT:moist mucous membranes, pharynx clear  NECK: supple. Thyroid normal  RESPIRATORY: Chest: clear to ascultation and percussion   CARDIOVASCULAR: Heart: regular rate and rhythm  GASTROINTESTINAL: Abdomen: soft, bowel sounds active  HEMATOLOGIC: no pathological lymph nodes palpated  MUSCULOSKELETAL: Extremities: no edema, pulse 1+   INTEGUMENT: No unusual rashes or suspicious skin lesions noted. Nails appear normal.  NEUROLOGIC: non-focal exam   MENTAL STATUS: alert and oriented, appropriate affect      ASSESSMENT:  1. Essential hypertension    2. Hypothyroidism due to medication    3. Panlobular emphysema ([address]    4. Dyslipidemia    5. Prerenal azotemia        PLAN:  1. Her blood pressure is excellent today. No adjustments are made.   2. TSH would be checked in view of her history of hypothyroidism. 3. I encouraged her to discontinue cigarette smoking. She does have existing COPD. 4. She has significantly increased cardiovascular risk and remains on a statin, and efficacy and compliance would be assessed. 5. She does have a history of prerenal azotemia, and renal values also will be assessed. 6. I have strongly encouraged her to get her COVID vaccine, and she reluctantly agrees. 7. She needs to increase her physical activity like getting out of the house or preferably walking outside. .  Orders Placed This Encounter    APOLIPOPROTEIN B    CBC WITH AUTOMATED DIFF    LIPID PANEL    METABOLIC PANEL, COMPREHENSIVE    TSH 3RD GENERATION    URINALYSIS W/ RFLX MICROSCOPIC         Follow-up and Dispositions    · Return in about 4 months (around 6/22/2021).            Bello Hubbard MD

## 2021-02-23 LAB
ALBUMIN SERPL-MCNC: 4.3 G/DL (ref 3.5–5)
ALBUMIN/GLOB SERPL: 1.3 {RATIO} (ref 1.1–2.2)
ALP SERPL-CCNC: 97 U/L (ref 45–117)
ALT SERPL-CCNC: 18 U/L (ref 12–78)
ANION GAP SERPL CALC-SCNC: 8 MMOL/L (ref 5–15)
APPEARANCE UR: CLEAR
AST SERPL-CCNC: 18 U/L (ref 15–37)
BACTERIA URNS QL MICRO: ABNORMAL /HPF
BASOPHILS # BLD: 0 K/UL (ref 0–0.1)
BASOPHILS NFR BLD: 1 % (ref 0–1)
BILIRUB SERPL-MCNC: 0.4 MG/DL (ref 0.2–1)
BILIRUB UR QL: NEGATIVE
BUN SERPL-MCNC: 15 MG/DL (ref 6–20)
BUN/CREAT SERPL: 12 (ref 12–20)
CALCIUM SERPL-MCNC: 10.3 MG/DL (ref 8.5–10.1)
CHLORIDE SERPL-SCNC: 108 MMOL/L (ref 97–108)
CHOLEST SERPL-MCNC: 239 MG/DL
CO2 SERPL-SCNC: 23 MMOL/L (ref 21–32)
COLOR UR: ABNORMAL
CREAT SERPL-MCNC: 1.3 MG/DL (ref 0.55–1.02)
DIFFERENTIAL METHOD BLD: ABNORMAL
EOSINOPHIL # BLD: 0.1 K/UL (ref 0–0.4)
EOSINOPHIL NFR BLD: 1 % (ref 0–7)
EPITH CASTS URNS QL MICRO: ABNORMAL /LPF
ERYTHROCYTE [DISTWIDTH] IN BLOOD BY AUTOMATED COUNT: 14.5 % (ref 11.5–14.5)
GLOBULIN SER CALC-MCNC: 3.4 G/DL (ref 2–4)
GLUCOSE SERPL-MCNC: 91 MG/DL (ref 65–100)
GLUCOSE UR STRIP.AUTO-MCNC: NEGATIVE MG/DL
HCT VFR BLD AUTO: 40.6 % (ref 35–47)
HDLC SERPL-MCNC: 146 MG/DL
HDLC SERPL: 1.6 {RATIO} (ref 0–5)
HGB BLD-MCNC: 13.3 G/DL (ref 11.5–16)
HGB UR QL STRIP: NEGATIVE
IMM GRANULOCYTES # BLD AUTO: 0 K/UL (ref 0–0.04)
IMM GRANULOCYTES NFR BLD AUTO: 1 % (ref 0–0.5)
KETONES UR QL STRIP.AUTO: ABNORMAL MG/DL
LDLC SERPL CALC-MCNC: 74 MG/DL (ref 0–100)
LEUKOCYTE ESTERASE UR QL STRIP.AUTO: NEGATIVE
LIPID PROFILE,FLP: ABNORMAL
LYMPHOCYTES # BLD: 1.1 K/UL (ref 0.8–3.5)
LYMPHOCYTES NFR BLD: 25 % (ref 12–49)
MCH RBC QN AUTO: 31.9 PG (ref 26–34)
MCHC RBC AUTO-ENTMCNC: 32.8 G/DL (ref 30–36.5)
MCV RBC AUTO: 97.4 FL (ref 80–99)
MONOCYTES # BLD: 0.5 K/UL (ref 0–1)
MONOCYTES NFR BLD: 11 % (ref 5–13)
NEUTS SEG # BLD: 2.7 K/UL (ref 1.8–8)
NEUTS SEG NFR BLD: 61 % (ref 32–75)
NITRITE UR QL STRIP.AUTO: NEGATIVE
NRBC # BLD: 0 K/UL (ref 0–0.01)
NRBC BLD-RTO: 0 PER 100 WBC
PH UR STRIP: 5 [PH] (ref 5–8)
PLATELET # BLD AUTO: 278 K/UL (ref 150–400)
PMV BLD AUTO: 11.6 FL (ref 8.9–12.9)
POTASSIUM SERPL-SCNC: 5.3 MMOL/L (ref 3.5–5.1)
PROT SERPL-MCNC: 7.7 G/DL (ref 6.4–8.2)
PROT UR STRIP-MCNC: 30 MG/DL
RBC # BLD AUTO: 4.17 M/UL (ref 3.8–5.2)
RBC #/AREA URNS HPF: ABNORMAL /HPF (ref 0–5)
SODIUM SERPL-SCNC: 139 MMOL/L (ref 136–145)
SP GR UR REFRACTOMETRY: 1.02 (ref 1–1.03)
TRIGL SERPL-MCNC: 95 MG/DL (ref ?–150)
TSH SERPL DL<=0.05 MIU/L-ACNC: 3.43 UIU/ML (ref 0.36–3.74)
UROBILINOGEN UR QL STRIP.AUTO: 0.2 EU/DL (ref 0.2–1)
VLDLC SERPL CALC-MCNC: 19 MG/DL
WBC # BLD AUTO: 4.4 K/UL (ref 3.6–11)
WBC URNS QL MICRO: ABNORMAL /HPF (ref 0–4)

## 2021-02-24 LAB — APO B SERPL-MCNC: 69 MG/DL

## 2021-06-22 ENCOUNTER — OFFICE VISIT (OUTPATIENT)
Dept: INTERNAL MEDICINE CLINIC | Age: 81
End: 2021-06-22
Payer: MEDICARE

## 2021-06-22 VITALS
TEMPERATURE: 98.4 F | WEIGHT: 94.6 LBS | HEIGHT: 63 IN | DIASTOLIC BLOOD PRESSURE: 60 MMHG | SYSTOLIC BLOOD PRESSURE: 124 MMHG | RESPIRATION RATE: 14 BRPM | OXYGEN SATURATION: 95 % | HEART RATE: 86 BPM | BODY MASS INDEX: 16.76 KG/M2

## 2021-06-22 DIAGNOSIS — J43.1 PANLOBULAR EMPHYSEMA (HCC): ICD-10-CM

## 2021-06-22 DIAGNOSIS — E03.2 HYPOTHYROIDISM DUE TO MEDICATION: ICD-10-CM

## 2021-06-22 DIAGNOSIS — E78.5 DYSLIPIDEMIA: ICD-10-CM

## 2021-06-22 DIAGNOSIS — I10 ESSENTIAL HYPERTENSION: Primary | ICD-10-CM

## 2021-06-22 LAB
ANION GAP SERPL CALC-SCNC: 7 MMOL/L (ref 5–15)
BUN SERPL-MCNC: 15 MG/DL (ref 6–20)
BUN/CREAT SERPL: 12 (ref 12–20)
CALCIUM SERPL-MCNC: 9.5 MG/DL (ref 8.5–10.1)
CHLORIDE SERPL-SCNC: 115 MMOL/L (ref 97–108)
CO2 SERPL-SCNC: 21 MMOL/L (ref 21–32)
CREAT SERPL-MCNC: 1.21 MG/DL (ref 0.55–1.02)
GLUCOSE SERPL-MCNC: 111 MG/DL (ref 65–100)
POTASSIUM SERPL-SCNC: 3.8 MMOL/L (ref 3.5–5.1)
SODIUM SERPL-SCNC: 143 MMOL/L (ref 136–145)
TSH SERPL DL<=0.05 MIU/L-ACNC: 2.84 UIU/ML (ref 0.36–3.74)

## 2021-06-22 PROCEDURE — G8419 CALC BMI OUT NRM PARAM NOF/U: HCPCS | Performed by: INTERNAL MEDICINE

## 2021-06-22 PROCEDURE — 99214 OFFICE O/P EST MOD 30 MIN: CPT | Performed by: INTERNAL MEDICINE

## 2021-06-22 PROCEDURE — 1090F PRES/ABSN URINE INCON ASSESS: CPT | Performed by: INTERNAL MEDICINE

## 2021-06-22 PROCEDURE — G8754 DIAS BP LESS 90: HCPCS | Performed by: INTERNAL MEDICINE

## 2021-06-22 PROCEDURE — 1101F PT FALLS ASSESS-DOCD LE1/YR: CPT | Performed by: INTERNAL MEDICINE

## 2021-06-22 PROCEDURE — G8752 SYS BP LESS 140: HCPCS | Performed by: INTERNAL MEDICINE

## 2021-06-22 PROCEDURE — G8536 NO DOC ELDER MAL SCRN: HCPCS | Performed by: INTERNAL MEDICINE

## 2021-06-22 PROCEDURE — G8510 SCR DEP NEG, NO PLAN REQD: HCPCS | Performed by: INTERNAL MEDICINE

## 2021-06-22 PROCEDURE — G8399 PT W/DXA RESULTS DOCUMENT: HCPCS | Performed by: INTERNAL MEDICINE

## 2021-06-22 PROCEDURE — G8427 DOCREV CUR MEDS BY ELIG CLIN: HCPCS | Performed by: INTERNAL MEDICINE

## 2021-06-22 NOTE — PROGRESS NOTES
[address] [place] [address] and Primary Care  [place] [address]  [address] [place] [address]  Phone:  [phone]  Fax: [phone]       Chief Complaint   Patient presents with    Hypertension     4 month follow up    . SUBJECTIVE:    Isiah Oliveira is a 79 y.o. female comes in for return visit stating that she has done well. She is not walking much, but she does get out of the house on occasion. Unfortunately, she continues to smoke cigarettes. She does have existing COPD as a direct result of this. She has a past history of primary hypertension, dyslipidemia as well as hypothyroidism. Fortunately, she got her vaccine.            Current Outpatient Medications   Medication Sig Dispense Refill    valsartan (DIOVAN) 160 mg tablet TAKE 1 TABLET BY MOUTH EVERY DAY 90 Tab 3    allopurinoL (ZYLOPRIM) 100 mg tablet TAKE 1 TABLET BY MOUTH EVERY DAY 90 Tab 3    amLODIPine (NORVASC) 10 mg tablet TAKE 1 TABLET BY MOUTH EVERY DAY 90 Tab 3    pravastatin (PRAVACHOL) 40 mg tablet TAKE 1 TABLET BY MOUTH EVERYDAY AT BEDTIME 90 Tab 3    levothyroxine (SYNTHROID) 50 mcg tablet TAKE 1 TABLET ON AN EMPTY STOMACH IN THE MORNING ONCE A DAY ORALLY 90 Tab 3    colchicine (MITIGARE) 0.6 mg capsule TAKE 1 TABLET BY MOUTH EVERY DAY ( NON FORM / NOT COVERED) 30 Cap 5     Past Medical History:   Diagnosis Date    Hypercholesterolemia     Hypertension     Thyroid disease      Past Surgical History:   Procedure Laterality Date    HX COLONOSCOPY      HX GYN       No Known Allergies      REVIEW OF SYSTEMS:  General: negative for - chills or fever  ENT: negative for - headaches, nasal congestion or tinnitus  Respiratory: negative for - cough, hemoptysis, shortness of breath or wheezing  Cardiovascular : negative for - chest pain, edema, palpitations or shortness of breath  Gastrointestinal: negative for - abdominal pain, blood in stools, heartburn or nausea/vomiting  Genito-Urinary: no dysuria, trouble voiding, or hematuria  Musculoskeletal: negative for - gait disturbance, joint pain, joint stiffness or joint swelling  Neurological: no TIA or stroke symptoms  Hematologic: no bruises, no bleeding, no swollen glands  Integument: no lumps, mole changes, nail changes or rash  Endocrine: no malaise/lethargy or unexpected weight changes      Social History     Socioeconomic History    Marital status: UNKNOWN     Spouse name: Not on file    Number of children: 2    Years of education: Not on file    Highest education level: Not on file   Occupational History    Occupation: retired--long term work   Tobacco Use    Smoking status: Current Every Day Smoker    Smokeless tobacco: Never Used   Vaping Use    Vaping Use: Never used   Substance and Sexual Activity    Alcohol use: No    Drug use: No    Sexual activity: Not Currently     Social Determinants of Health     Financial Resource Strain:     Difficulty of Paying Living Expenses:    Food Insecurity:     Worried About Running Out of Food in the Last Year:     Ran Out of Food in the Last Year:    Transportation Needs:     Lack of Transportation (Medical):      Lack of Transportation (Non-Medical):    Physical Activity:     Days of Exercise per Week:     Minutes of Exercise per Session:    Stress:     Feeling of Stress :    Social Connections:     Frequency of Communication with Friends and Family:     Frequency of Social Gatherings with Friends and Family:     Attends Pentecostal Services:     Active Member of Clubs or Organizations:     Attends Club or Organization Meetings:     Marital Status:      Family History   Problem Relation Age of Onset    Cancer Paternal Grandfather        OBJECTIVE:    Visit Vitals  /60   Pulse 86   Temp 98.4 °F (36.9 °C) (Oral)   Resp 14   Ht 5' 3\" (1.6 m)   Wt 94 lb 9.6 oz (42.9 kg)   SpO2 95%   BMI 16.76 kg/m²     CONSTITUTIONAL: well , well nourished, appears age appropriate  EYES: perrla, eom intact  ENMT:moist mucous membranes, pharynx clear  NECK: supple. Thyroid normal  RESPIRATORY: Chest: clear to ascultation and percussion   CARDIOVASCULAR: Heart: regular rate and rhythm  GASTROINTESTINAL: Abdomen: soft, bowel sounds active  HEMATOLOGIC: no pathological lymph nodes palpated  MUSCULOSKELETAL: Extremities: no edema, pulse 1+   INTEGUMENT: No unusual rashes or suspicious skin lesions noted. Nails appear normal.  NEUROLOGIC: non-focal exam   MENTAL STATUS: alert and oriented, appropriate affect      ASSESSMENT:  1. Essential hypertension    2. Hypothyroidism due to medication    3. Panlobular emphysema ([address]    4. Dyslipidemia        PLAN:  1. Blood pressure is excellent today. No adjustments are made. 2. She will continue on thyroid supplement as prescribed. Efficacy and compliance would be assessed as far as her thyroid supplement is concerned. 3. I again encouraged her to discontinue the cigarette smoking. It is going to be very difficult for her because she is hopelessly addicted unfortunately. 4. She has a significant increased cardiovascular risk created by her age as well as her existing comorbidities. She remains on a statin. I will assess her particle count to ensure that she is optimally protected.     .  Orders Placed This Encounter    METABOLIC PANEL, BASIC    TSH 3RD GENERATION    APOLIPOPROTEIN Jillian Sheikh MD

## 2021-06-22 NOTE — PROGRESS NOTES
Chief Complaint   Patient presents with    Hypertension     4 month follow up          1. Have you been to the ER, urgent care clinic since your last visit? Hospitalized since your last visit? No    2. Have you seen or consulted any other health care providers outside of the [address] [place] [address] since your last visit? Include any pap smears or colon screening.  No

## 2021-06-23 LAB — APO B SERPL-MCNC: 62 MG/DL

## 2021-07-17 RX ORDER — ALLOPURINOL 100 MG/1
TABLET ORAL
Qty: 90 TABLET | Refills: 3 | Status: SHIPPED | OUTPATIENT
Start: 2021-07-17 | End: 2022-04-19

## 2021-07-17 RX ORDER — PRAVASTATIN SODIUM 40 MG/1
TABLET ORAL
Qty: 90 TABLET | Refills: 3 | Status: SHIPPED | OUTPATIENT
Start: 2021-07-17 | End: 2022-07-14

## 2021-07-21 RX ORDER — LEVOTHYROXINE SODIUM 50 UG/1
TABLET ORAL
Qty: 90 TABLET | Refills: 3 | Status: SHIPPED | OUTPATIENT
Start: 2021-07-21 | End: 2022-07-15

## 2021-07-21 RX ORDER — AMLODIPINE BESYLATE 10 MG/1
TABLET ORAL
Qty: 90 TABLET | Refills: 3 | Status: SHIPPED | OUTPATIENT
Start: 2021-07-21 | End: 2022-07-15

## 2021-10-25 ENCOUNTER — OFFICE VISIT (OUTPATIENT)
Dept: INTERNAL MEDICINE CLINIC | Age: 81
End: 2021-10-25
Payer: MEDICARE

## 2021-10-25 VITALS
OXYGEN SATURATION: 96 % | HEART RATE: 87 BPM | SYSTOLIC BLOOD PRESSURE: 150 MMHG | BODY MASS INDEX: 16.71 KG/M2 | RESPIRATION RATE: 16 BRPM | HEIGHT: 63 IN | TEMPERATURE: 98.1 F | DIASTOLIC BLOOD PRESSURE: 70 MMHG | WEIGHT: 94.3 LBS

## 2021-10-25 DIAGNOSIS — E03.2 HYPOTHYROIDISM DUE TO MEDICATION: ICD-10-CM

## 2021-10-25 DIAGNOSIS — E78.5 DYSLIPIDEMIA: ICD-10-CM

## 2021-10-25 DIAGNOSIS — Z00.00 WELLNESS EXAMINATION: ICD-10-CM

## 2021-10-25 DIAGNOSIS — I10 PRIMARY HYPERTENSION: Primary | ICD-10-CM

## 2021-10-25 DIAGNOSIS — M10.9 GOUT, UNSPECIFIED CAUSE, UNSPECIFIED CHRONICITY, UNSPECIFIED SITE: ICD-10-CM

## 2021-10-25 DIAGNOSIS — J43.1 PANLOBULAR EMPHYSEMA (HCC): ICD-10-CM

## 2021-10-25 DIAGNOSIS — Z13.31 SCREENING FOR DEPRESSION: ICD-10-CM

## 2021-10-25 PROCEDURE — G8399 PT W/DXA RESULTS DOCUMENT: HCPCS | Performed by: INTERNAL MEDICINE

## 2021-10-25 PROCEDURE — G8510 SCR DEP NEG, NO PLAN REQD: HCPCS | Performed by: INTERNAL MEDICINE

## 2021-10-25 PROCEDURE — G8419 CALC BMI OUT NRM PARAM NOF/U: HCPCS | Performed by: INTERNAL MEDICINE

## 2021-10-25 PROCEDURE — G8427 DOCREV CUR MEDS BY ELIG CLIN: HCPCS | Performed by: INTERNAL MEDICINE

## 2021-10-25 PROCEDURE — G0439 PPPS, SUBSEQ VISIT: HCPCS | Performed by: INTERNAL MEDICINE

## 2021-10-25 PROCEDURE — G8753 SYS BP > OR = 140: HCPCS | Performed by: INTERNAL MEDICINE

## 2021-10-25 PROCEDURE — 1090F PRES/ABSN URINE INCON ASSESS: CPT | Performed by: INTERNAL MEDICINE

## 2021-10-25 PROCEDURE — 99214 OFFICE O/P EST MOD 30 MIN: CPT | Performed by: INTERNAL MEDICINE

## 2021-10-25 PROCEDURE — 1101F PT FALLS ASSESS-DOCD LE1/YR: CPT | Performed by: INTERNAL MEDICINE

## 2021-10-25 PROCEDURE — G8536 NO DOC ELDER MAL SCRN: HCPCS | Performed by: INTERNAL MEDICINE

## 2021-10-25 PROCEDURE — G8754 DIAS BP LESS 90: HCPCS | Performed by: INTERNAL MEDICINE

## 2021-10-25 RX ORDER — COLCHICINE 0.6 MG/1
CAPSULE ORAL
Qty: 30 CAPSULE | Refills: 5 | Status: CANCELLED | OUTPATIENT
Start: 2021-10-25

## 2021-10-25 NOTE — PATIENT INSTRUCTIONS

## 2021-10-25 NOTE — PROGRESS NOTES
Chief Complaint   Patient presents with    Hypertension     4 month follow up          1. Have you been to the ER, urgent care clinic since your last visit? Hospitalized since your last visit? No    2. Have you seen or consulted any other health care providers outside of the [address] [place] [address] since your last visit? Include any pap smears or colon screening.  No

## 2021-10-25 NOTE — PROGRESS NOTES
[address] [place] [address] and Primary Care  [place] [address]  [address] [place] [address]  Phone:  [phone]  Fax: [phone]       Chief Complaint   Patient presents with   [facility] Wellness Visit   . SUBJECTIVE:    Adolfo Tran is a 79 y.o. female comes in for return visit stating that she has done reasonably well. She has no complaints for the most part. She is maintaining her weight nicely. She has a past history of primary hypertension, dyslipidemia, and hypothyroidism. She is up to date on her vaccine. She is reasonably active.            Current Outpatient Medications   Medication Sig Dispense Refill    amLODIPine (NORVASC) 10 mg tablet TAKE 1 TABLET BY MOUTH EVERY DAY 90 Tablet 3    levothyroxine (SYNTHROID) 50 mcg tablet TAKE 1 TABLET ON AN EMPTY STOMACH IN THE MORNING ONCE A DAY ORALLY 90 Tablet 3    allopurinoL (ZYLOPRIM) 100 mg tablet TAKE 1 TABLET BY MOUTH EVERY DAY 90 Tablet 3    pravastatin (PRAVACHOL) 40 mg tablet TAKE 1 TABLET BY MOUTH EVERYDAY AT BEDTIME 90 Tablet 3    valsartan (DIOVAN) 160 mg tablet TAKE 1 TABLET BY MOUTH EVERY DAY 90 Tab 3    colchicine (MITIGARE) 0.6 mg capsule TAKE 1 TABLET BY MOUTH EVERY DAY ( NON FORM / NOT COVERED) 30 Cap 5     Past Medical History:   Diagnosis Date    Hypercholesterolemia     Hypertension     Thyroid disease      Past Surgical History:   Procedure Laterality Date    HX COLONOSCOPY      HX GYN       No Known Allergies      REVIEW OF SYSTEMS:  General: negative for - chills or fever  ENT: negative for - headaches, nasal congestion or tinnitus  Respiratory: negative for - cough, hemoptysis, shortness of breath or wheezing  Cardiovascular : negative for - chest pain, edema, palpitations or shortness of breath  Gastrointestinal: negative for - abdominal pain, blood in stools, heartburn or nausea/vomiting  Genito-Urinary: no dysuria, trouble voiding, or hematuria  Musculoskeletal: negative for - gait disturbance, joint pain, joint stiffness or joint swelling  Neurological: no TIA or stroke symptoms  Hematologic: no bruises, no bleeding, no swollen glands  Integument: no lumps, mole changes, nail changes or rash  Endocrine: no malaise/lethargy or unexpected weight changes      Social History     Socioeconomic History    Marital status: UNKNOWN     Spouse name: Not on file    Number of children: 2    Years of education: Not on file    Highest education level: Not on file   Occupational History    Occupation: retired--alf work   Tobacco Use    Smoking status: Current Every Day Smoker    Smokeless tobacco: Never Used   Vaping Use    Vaping Use: Never used   Substance and Sexual Activity    Alcohol use: No    Drug use: No    Sexual activity: Not Currently     Social Determinants of Health     Financial Resource Strain:     Difficulty of Paying Living Expenses:    Food Insecurity:     Worried About Running Out of Food in the Last Year:     Ran Out of Food in the Last Year:    Transportation Needs:     Lack of Transportation (Medical):  Lack of Transportation (Non-Medical):    Physical Activity:     Days of Exercise per Week:     Minutes of Exercise per Session:    Stress:     Feeling of Stress :    Social Connections:     Frequency of Communication with Friends and Family:     Frequency of Social Gatherings with Friends and Family:     Attends Gnosticist Services:     Active Member of Clubs or Organizations:     Attends Club or Organization Meetings:     Marital Status:      Family History   Problem Relation Age of Onset    Cancer Paternal Grandfather        OBJECTIVE:    Visit Vitals  BP (!) 150/70   Pulse 87   Temp 98.1 °F (36.7 °C) (Oral)   Resp 16   Ht 5' 3\" (1.6 m)   Wt 94 lb 4.8 oz (42.8 kg)   SpO2 96%   BMI 16.70 kg/m²     CONSTITUTIONAL: well , well nourished, appears age appropriate  EYES: perrla, eom intact  ENMT:moist mucous membranes, pharynx clear  NECK: supple.  Thyroid normal  RESPIRATORY: Chest: clear to ascultation and percussion   CARDIOVASCULAR: Heart: regular rate and rhythm  GASTROINTESTINAL: Abdomen: soft, bowel sounds active  HEMATOLOGIC: no pathological lymph nodes palpated  MUSCULOSKELETAL: Extremities: no edema, pulse 1+   INTEGUMENT: No unusual rashes or suspicious skin lesions noted. Nails appear normal.  NEUROLOGIC: non-focal exam   MENTAL STATUS: alert and oriented, appropriate affect      ASSESSMENT:  1. Primary hypertension    2. Hypothyroidism due to medication    3. Panlobular emphysema ([address]    4. Dyslipidemia    5. Gout, unspecified cause, unspecified chronicity, unspecified site    6. Screening for depression    7. Wellness examination          PLAN:  1. The patient's blood pressure is slightly elevated, but no adjustment is made today. 2. She will continue her thyroid supplement. Her last TSH value was excellent. 3. I again encouraged her to discontinue cigarette smoking, which is making an active effort to do. She does have existing COPD. 4. She remains on a statin in view of her increased cardiovascular risk. Her last particle count was excellent. 5. Her gout is quite stable. She remains on allopurinol and colchicine, the latter which she probably does need currently. Follow-up and Dispositions    · Return in about 3 months (around 1/25/2022). Elise Limon MD  This is the Subsequent Medicare Annual Wellness Exam, performed 12 months or more after the Initial AWV or the last Subsequent AWV    I have reviewed the patient's medical history in detail and updated the computerized patient record. Assessment/Plan   Education and counseling provided:  Are appropriate based on today's review and evaluation    1. Primary hypertension  2. Hypothyroidism due to medication  3. Panlobular emphysema ([address]  4. Dyslipidemia  5. Gout, unspecified cause, unspecified chronicity, unspecified site  6. Screening for depression  -     DEPRESSION SCREEN ANNUAL  7. Wellness examination       Depression Risk Factor Screening     3 most recent PHQ Screens 10/25/2021   PHQ Not Done -   Little interest or pleasure in doing things Not at all   Feeling down, depressed, irritable, or hopeless Not at all   Total Score PHQ 2 0       Alcohol Risk Screen    Do you average more than 1 drink per night or more than 7 drinks a week:  No    On any one occasion in the past three months have you have had more than 3 drinks containing alcohol:  No        Functional Ability and Level of Safety    Hearing: Hearing is good. Activities of Daily Living: The home contains: no safety equipment. Patient does total self care      Ambulation: with no difficulty     Fall Risk:  Fall Risk Assessment, last 12 mths 10/25/2021   Able to walk? Yes   Fall in past 12 months? 0   Do you feel unsteady? 0   Are you worried about falling 0   Fall with injury?  -      Abuse Screen:  Patient is not abused       Cognitive Screening    Has your family/caregiver stated any concerns about your memory: no     Cognitive Screening: Not applicable    Health Maintenance Due     Health Maintenance Due   Topic Date Due    COVID-19 Vaccine (1) Never done    Flu Vaccine (1) Never done       Patient Care Team   Patient Care Team:  Anu Izaguirre MD as PCP - General (Internal Medicine)  Anu Izaguirre MD [facility] PCP [facility] Empaneled Provider    History     Patient Active Problem List   Diagnosis Code    COPD [facility]chronic obstructive pulmonary disease) ([facility] J44.9    Hypertension I10    Gout M10.9    Low back pain M54.50    Dyslipidemia E78.5    Hypothyroidism E03.9    Prerenal azotemia R79.89     Past Medical History:   Diagnosis Date    Hypercholesterolemia     Hypertension     Thyroid disease       Past Surgical History:   Procedure Laterality Date    HX COLONOSCOPY      HX GYN       Current Outpatient Medications   Medication Sig Dispense Refill    amLODIPine (NORVASC) 10 mg tablet TAKE 1 TABLET BY MOUTH EVERY DAY 90 Tablet 3    levothyroxine (SYNTHROID) 50 mcg tablet TAKE 1 TABLET ON AN EMPTY STOMACH IN THE MORNING ONCE A DAY ORALLY 90 Tablet 3    allopurinoL (ZYLOPRIM) 100 mg tablet TAKE 1 TABLET BY MOUTH EVERY DAY 90 Tablet 3    pravastatin (PRAVACHOL) 40 mg tablet TAKE 1 TABLET BY MOUTH EVERYDAY AT BEDTIME 90 Tablet 3    valsartan (DIOVAN) 160 mg tablet TAKE 1 TABLET BY MOUTH EVERY DAY 90 Tab 3    colchicine (MITIGARE) 0.6 mg capsule TAKE 1 TABLET BY MOUTH EVERY DAY ( NON FORM / NOT COVERED) 30 Cap 5     No Known Allergies    Family History   Problem Relation Age of Onset    Cancer Paternal Grandfather      Social History     Tobacco Use    Smoking status: Current Every Day Smoker    Smokeless tobacco: Never Used   Substance Use Topics    Alcohol use: No         Jael Emerson MD

## 2022-01-31 ENCOUNTER — OFFICE VISIT (OUTPATIENT)
Dept: INTERNAL MEDICINE CLINIC | Age: 82
End: 2022-01-31
Payer: MEDICARE

## 2022-01-31 VITALS
HEART RATE: 83 BPM | HEIGHT: 63 IN | DIASTOLIC BLOOD PRESSURE: 69 MMHG | SYSTOLIC BLOOD PRESSURE: 130 MMHG | BODY MASS INDEX: 16.66 KG/M2 | RESPIRATION RATE: 14 BRPM | TEMPERATURE: 97.8 F | WEIGHT: 94 LBS

## 2022-01-31 DIAGNOSIS — I10 PRIMARY HYPERTENSION: Primary | ICD-10-CM

## 2022-01-31 DIAGNOSIS — E78.5 DYSLIPIDEMIA: ICD-10-CM

## 2022-01-31 DIAGNOSIS — J43.1 PANLOBULAR EMPHYSEMA (HCC): ICD-10-CM

## 2022-01-31 DIAGNOSIS — E03.2 HYPOTHYROIDISM DUE TO MEDICATION: ICD-10-CM

## 2022-01-31 PROCEDURE — G8399 PT W/DXA RESULTS DOCUMENT: HCPCS | Performed by: INTERNAL MEDICINE

## 2022-01-31 PROCEDURE — G8510 SCR DEP NEG, NO PLAN REQD: HCPCS | Performed by: INTERNAL MEDICINE

## 2022-01-31 PROCEDURE — G8419 CALC BMI OUT NRM PARAM NOF/U: HCPCS | Performed by: INTERNAL MEDICINE

## 2022-01-31 PROCEDURE — 1090F PRES/ABSN URINE INCON ASSESS: CPT | Performed by: INTERNAL MEDICINE

## 2022-01-31 PROCEDURE — G8536 NO DOC ELDER MAL SCRN: HCPCS | Performed by: INTERNAL MEDICINE

## 2022-01-31 PROCEDURE — G8427 DOCREV CUR MEDS BY ELIG CLIN: HCPCS | Performed by: INTERNAL MEDICINE

## 2022-01-31 PROCEDURE — G8752 SYS BP LESS 140: HCPCS | Performed by: INTERNAL MEDICINE

## 2022-01-31 PROCEDURE — 1101F PT FALLS ASSESS-DOCD LE1/YR: CPT | Performed by: INTERNAL MEDICINE

## 2022-01-31 PROCEDURE — G8754 DIAS BP LESS 90: HCPCS | Performed by: INTERNAL MEDICINE

## 2022-01-31 PROCEDURE — 99214 OFFICE O/P EST MOD 30 MIN: CPT | Performed by: INTERNAL MEDICINE

## 2022-01-31 RX ORDER — VALSARTAN 160 MG/1
160 TABLET ORAL DAILY
Qty: 90 TABLET | Refills: 3 | Status: SHIPPED | OUTPATIENT
Start: 2022-01-31

## 2022-01-31 NOTE — PROGRESS NOTES
Chief Complaint   Patient presents with    Hypertension     3 month follow up          1. Have you been to the ER, urgent care clinic since your last visit? Hospitalized since your last visit? No    2. Have you seen or consulted any other health care providers outside of the [address] [place] [address] since your last visit? Include any pap smears or colon screening.  No

## 2022-02-01 LAB
ANION GAP SERPL CALC-SCNC: 6 MMOL/L (ref 5–15)
BUN SERPL-MCNC: 13 MG/DL (ref 6–20)
BUN/CREAT SERPL: 12 (ref 12–20)
CALCIUM SERPL-MCNC: 9.4 MG/DL (ref 8.5–10.1)
CHLORIDE SERPL-SCNC: 111 MMOL/L (ref 97–108)
CO2 SERPL-SCNC: 23 MMOL/L (ref 21–32)
CREAT SERPL-MCNC: 1.1 MG/DL (ref 0.55–1.02)
GLUCOSE SERPL-MCNC: 104 MG/DL (ref 65–100)
POTASSIUM SERPL-SCNC: 4.8 MMOL/L (ref 3.5–5.1)
SODIUM SERPL-SCNC: 140 MMOL/L (ref 136–145)
TSH SERPL DL<=0.05 MIU/L-ACNC: 3.32 UIU/ML (ref 0.36–3.74)

## 2022-02-13 NOTE — PROGRESS NOTES
[address] [facility]  [place] [address]  [address] [place] [address]  Phone:  [phone]  Fax: [phone]       Chief Complaint   Patient presents with    Hypertension     3 month follow up    . SUBJECTIVE:    Gisele Gong is a 79 y.o. female comes in for return visit stating that she has done reasonably well. She has been taking her medications as prescribed. She has history of primary hypertension, dyslipidemia, gout, hypothyroidism and COPD. She is reasonably active. She is maintaining her weight nicely. She does need a refill however on her valsartan. Current Outpatient Medications   Medication Sig Dispense Refill    valsartan (DIOVAN) 160 mg tablet Take 1 Tablet by mouth daily.  90 Tablet 3    amLODIPine (NORVASC) 10 mg tablet TAKE 1 TABLET BY MOUTH EVERY DAY 90 Tablet 3    levothyroxine (SYNTHROID) 50 mcg tablet TAKE 1 TABLET ON AN EMPTY STOMACH IN THE MORNING ONCE A DAY ORALLY 90 Tablet 3    allopurinoL (ZYLOPRIM) 100 mg tablet TAKE 1 TABLET BY MOUTH EVERY DAY 90 Tablet 3    pravastatin (PRAVACHOL) 40 mg tablet TAKE 1 TABLET BY MOUTH EVERYDAY AT BEDTIME 90 Tablet 3    colchicine (MITIGARE) 0.6 mg capsule TAKE 1 TABLET BY MOUTH EVERY DAY ( NON FORM / NOT COVERED) 30 Cap 5     Past Medical History:   Diagnosis Date    Hypercholesterolemia     Hypertension     Thyroid disease      Past Surgical History:   Procedure Laterality Date    HX COLONOSCOPY      HX GYN       No Known Allergies      REVIEW OF SYSTEMS:  General: negative for - chills or fever  ENT: negative for - headaches, nasal congestion or tinnitus  Respiratory: negative for - cough, hemoptysis, shortness of breath or wheezing  Cardiovascular : negative for - chest pain, edema, palpitations or shortness of breath  Gastrointestinal: negative for - abdominal pain, blood in stools, heartburn or nausea/vomiting  Genito-Urinary: no dysuria, trouble voiding, or hematuria  Musculoskeletal: negative for - gait disturbance, joint pain, joint stiffness or joint swelling  Neurological: no TIA or stroke symptoms  Hematologic: no bruises, no bleeding, no swollen glands  Integument: no lumps, mole changes, nail changes or rash  Endocrine: no malaise/lethargy or unexpected weight changes      Social History     Socioeconomic History    Marital status: UNKNOWN    Number of children: 2   Occupational History    Occupation: retired--senior living work   Tobacco Use    Smoking status: Current Every Day Smoker    Smokeless tobacco: Never Used   Vaping Use    Vaping Use: Never used   Substance and Sexual Activity    Alcohol use: No    Drug use: No    Sexual activity: Not Currently     Family History   Problem Relation Age of Onset    Cancer Paternal Grandfather        OBJECTIVE:    Visit Vitals  /69   Pulse 83   Temp 97.8 °F (36.6 °C) (Oral)   Resp 14   Ht 5' 3\" (1.6 m)   Wt 94 lb (42.6 kg)   BMI 16.65 kg/m²     CONSTITUTIONAL: well , well nourished, appears age appropriate  EYES: perrla, eom intact  ENMT:moist mucous membranes, pharynx clear  NECK: supple. Thyroid normal  RESPIRATORY: Chest: clear to ascultation and percussion   CARDIOVASCULAR: Heart: regular rate and rhythm  GASTROINTESTINAL: Abdomen: soft, bowel sounds active  HEMATOLOGIC: no pathological lymph nodes palpated  MUSCULOSKELETAL: Extremities: no edema, pulse 1+   INTEGUMENT: No unusual rashes or suspicious skin lesions noted. Nails appear normal.  NEUROLOGIC: non-focal exam   MENTAL STATUS: alert and oriented, appropriate affect      ASSESSMENT:  1. Primary hypertension    2. Hypothyroidism due to medication    3. Panlobular emphysema ([address]    4. Dyslipidemia        PLAN:  1. Blood pressure is excellent, no adjustments are made today. 2. She will continue her thyroid supplement as prescribed and efficacy and compliance will be assessed.   3. I again encouraged her to discontinue her cigarette smoking which she is actively doing particularly given her age of 79. She has no pulmonary symptoms currently. 4. She has an increased cardiovascular risk and is maintained on her statin. Her last particle count was excellent. .  Orders Placed This Encounter    METABOLIC PANEL, BASIC    TSH 3RD GENERATION    valsartan (DIOVAN) 160 mg tablet         Follow-up and Dispositions    · Return in about 4 months (around 5/31/2022).            Crystal Garcia MD

## 2022-03-18 PROBLEM — R79.89 PRERENAL AZOTEMIA: Status: ACTIVE | Noted: 2020-10-19

## 2022-05-31 ENCOUNTER — OFFICE VISIT (OUTPATIENT)
Dept: INTERNAL MEDICINE CLINIC | Age: 82
End: 2022-05-31
Payer: MEDICARE

## 2022-05-31 VITALS
TEMPERATURE: 97.9 F | WEIGHT: 94.6 LBS | RESPIRATION RATE: 14 BRPM | SYSTOLIC BLOOD PRESSURE: 116 MMHG | HEIGHT: 63 IN | DIASTOLIC BLOOD PRESSURE: 60 MMHG | OXYGEN SATURATION: 96 % | BODY MASS INDEX: 16.76 KG/M2 | HEART RATE: 87 BPM

## 2022-05-31 DIAGNOSIS — E03.2 HYPOTHYROIDISM DUE TO MEDICATION: ICD-10-CM

## 2022-05-31 DIAGNOSIS — I10 PRIMARY HYPERTENSION: Primary | ICD-10-CM

## 2022-05-31 DIAGNOSIS — J43.1 PANLOBULAR EMPHYSEMA (HCC): ICD-10-CM

## 2022-05-31 DIAGNOSIS — E78.5 DYSLIPIDEMIA: ICD-10-CM

## 2022-05-31 DIAGNOSIS — M10.9 GOUT, UNSPECIFIED CAUSE, UNSPECIFIED CHRONICITY, UNSPECIFIED SITE: ICD-10-CM

## 2022-05-31 PROCEDURE — G8754 DIAS BP LESS 90: HCPCS | Performed by: INTERNAL MEDICINE

## 2022-05-31 PROCEDURE — G8399 PT W/DXA RESULTS DOCUMENT: HCPCS | Performed by: INTERNAL MEDICINE

## 2022-05-31 PROCEDURE — 99214 OFFICE O/P EST MOD 30 MIN: CPT | Performed by: INTERNAL MEDICINE

## 2022-05-31 PROCEDURE — G8536 NO DOC ELDER MAL SCRN: HCPCS | Performed by: INTERNAL MEDICINE

## 2022-05-31 PROCEDURE — G8419 CALC BMI OUT NRM PARAM NOF/U: HCPCS | Performed by: INTERNAL MEDICINE

## 2022-05-31 PROCEDURE — G8752 SYS BP LESS 140: HCPCS | Performed by: INTERNAL MEDICINE

## 2022-05-31 PROCEDURE — G8510 SCR DEP NEG, NO PLAN REQD: HCPCS | Performed by: INTERNAL MEDICINE

## 2022-05-31 PROCEDURE — 1123F ACP DISCUSS/DSCN MKR DOCD: CPT | Performed by: INTERNAL MEDICINE

## 2022-05-31 PROCEDURE — 1101F PT FALLS ASSESS-DOCD LE1/YR: CPT | Performed by: INTERNAL MEDICINE

## 2022-05-31 PROCEDURE — G8427 DOCREV CUR MEDS BY ELIG CLIN: HCPCS | Performed by: INTERNAL MEDICINE

## 2022-05-31 PROCEDURE — 1090F PRES/ABSN URINE INCON ASSESS: CPT | Performed by: INTERNAL MEDICINE

## 2022-05-31 NOTE — PROGRESS NOTES
Chief Complaint   Patient presents with    Hypertension     4 month follow up          1. Have you been to the ER, urgent care clinic since your last visit? Hospitalized since your last visit? No    2. Have you seen or consulted any other health care providers outside of the [address] [place] [address] since your last visit? Include any pap smears or colon screening.  No

## 2022-05-31 NOTE — PROGRESS NOTES
[address] [facility]  [place] [address]  [address] [place] [address]  Phone:  [phone]  Fax: [phone]       Chief Complaint   Patient presents with    Hypertension     4 month follow up    . SUBJECTIVE:    Salinas Hall is a 79 y.o. female comes in for return visit stating that she has done reasonably well. She has no complaints for the most part. She is not that active and I encouraged her to improve this. Unfortunately she continues to smoke cigarettes. As a direct result she has COPD, but she is otherwise asymptomatic from a pulmonary perspective with minimal dyspnea on exertion or coughing. She has a past history of primary hypertension, dyslipidemia, as well as gout. She does have significantly increased cardiovascular risk created by her existing comorbidities with cigarette smoking being at the top of her list           Current Outpatient Medications   Medication Sig Dispense Refill    allopurinoL (ZYLOPRIM) 100 mg tablet TAKE 1 TABLET BY MOUTH EVERY DAY 90 Tablet 3    valsartan (DIOVAN) 160 mg tablet Take 1 Tablet by mouth daily.  90 Tablet 3    amLODIPine (NORVASC) 10 mg tablet TAKE 1 TABLET BY MOUTH EVERY DAY 90 Tablet 3    levothyroxine (SYNTHROID) 50 mcg tablet TAKE 1 TABLET ON AN EMPTY STOMACH IN THE MORNING ONCE A DAY ORALLY 90 Tablet 3    pravastatin (PRAVACHOL) 40 mg tablet TAKE 1 TABLET BY MOUTH EVERYDAY AT BEDTIME 90 Tablet 3    colchicine (MITIGARE) 0.6 mg capsule TAKE 1 TABLET BY MOUTH EVERY DAY ( NON FORM / NOT COVERED) (Patient not taking: Reported on 5/31/2022) 30 Cap 5     Past Medical History:   Diagnosis Date    Hypercholesterolemia     Hypertension     Thyroid disease      Past Surgical History:   Procedure Laterality Date    HX COLONOSCOPY      HX GYN       No Known Allergies      REVIEW OF SYSTEMS:  General: negative for - chills or fever  ENT: negative for - headaches, nasal congestion or tinnitus  Respiratory: negative for - cough, hemoptysis, shortness of breath or wheezing  Cardiovascular : negative for - chest pain, edema, palpitations or shortness of breath  Gastrointestinal: negative for - abdominal pain, blood in stools, heartburn or nausea/vomiting  Genito-Urinary: no dysuria, trouble voiding, or hematuria  Musculoskeletal: negative for - gait disturbance, joint pain, joint stiffness or joint swelling  Neurological: no TIA or stroke symptoms  Hematologic: no bruises, no bleeding, no swollen glands  Integument: no lumps, mole changes, nail changes or rash  Endocrine: no malaise/lethargy or unexpected weight changes      Social History     Socioeconomic History    Marital status: UNKNOWN    Number of children: 2   Occupational History    Occupation: retired--long-term work   Tobacco Use    Smoking status: Current Every Day Smoker    Smokeless tobacco: Never Used   Vaping Use    Vaping Use: Never used   Substance and Sexual Activity    Alcohol use: No    Drug use: No    Sexual activity: Not Currently     Family History   Problem Relation Age of Onset    Cancer Paternal Grandfather        OBJECTIVE:    Visit Vitals  /60   Pulse 87   Temp 97.9 °F (36.6 °C) (Oral)   Resp 14   Ht 5' 3\" (1.6 m)   Wt 94 lb 9.6 oz (42.9 kg)   SpO2 96%   BMI 16.76 kg/m²     CONSTITUTIONAL: well , well nourished, appears age appropriate  EYES: perrla, eom intact  ENMT:moist mucous membranes, pharynx clear  NECK: supple. Thyroid normal  RESPIRATORY: Chest: clear to ascultation and percussion   CARDIOVASCULAR: Heart: regular rate and rhythm  GASTROINTESTINAL: Abdomen: soft, bowel sounds active  HEMATOLOGIC: no pathological lymph nodes palpated  MUSCULOSKELETAL: Extremities: no edema, pulse 1+   INTEGUMENT: No unusual rashes or suspicious skin lesions noted. Nails appear normal.  NEUROLOGIC: non-focal exam   MENTAL STATUS: alert and oriented, appropriate affect      ASSESSMENT:  1. Primary hypertension    2.  Hypothyroidism due to medication 3. Panlobular emphysema ([address]    4. Dyslipidemia    5. Gout, unspecified cause, unspecified chronicity, unspecified site        PLAN:  1. The patient's blood pressure is excellent today, no adjustments are made. 2. She does have a history of hypothyroidism and efficacy and compliance will be assessed as far as her medication is concerned. 3. She really needs to discontinue her cigarette smoking. Emphysema is stable however. 4. As I stated earlier she has significantly increased cardiovascular risk and remains on her statin. Particle count will be obtained. 5. Her gout has been quite stable with no flare ups of late. I emphasized the importance of taking her allopurinol. .  Orders Placed This Encounter    APOLIPOPROTEIN B    CBC WITH AUTOMATED DIFF    LIPID PANEL    METABOLIC PANEL, COMPREHENSIVE    TSH 3RD GENERATION    URINALYSIS W/ RFLX MICROSCOPIC    CRP, HIGH SENSITIVITY    URIC ACID         Follow-up and Dispositions    · Return in about 4 months (around 9/30/2022).            Sarahy Calvillo MD

## 2022-06-01 LAB
ALBUMIN SERPL-MCNC: 3.9 G/DL (ref 3.5–5)
ALBUMIN/GLOB SERPL: 1.1 {RATIO} (ref 1.1–2.2)
ALP SERPL-CCNC: 112 U/L (ref 45–117)
ALT SERPL-CCNC: 14 U/L (ref 12–78)
ANION GAP SERPL CALC-SCNC: 6 MMOL/L (ref 5–15)
APPEARANCE UR: ABNORMAL
AST SERPL-CCNC: 23 U/L (ref 15–37)
BASOPHILS # BLD: 0 K/UL (ref 0–0.1)
BASOPHILS NFR BLD: 0 % (ref 0–1)
BILIRUB SERPL-MCNC: 0.5 MG/DL (ref 0.2–1)
BILIRUB UR QL: NEGATIVE
BUN SERPL-MCNC: 13 MG/DL (ref 6–20)
BUN/CREAT SERPL: 10 (ref 12–20)
CALCIUM SERPL-MCNC: 9.5 MG/DL (ref 8.5–10.1)
CHLORIDE SERPL-SCNC: 111 MMOL/L (ref 97–108)
CHOLEST SERPL-MCNC: 223 MG/DL
CO2 SERPL-SCNC: 22 MMOL/L (ref 21–32)
COLOR UR: ABNORMAL
CREAT SERPL-MCNC: 1.24 MG/DL (ref 0.55–1.02)
CRP SERPL HS-MCNC: 2.7 MG/L
DIFFERENTIAL METHOD BLD: ABNORMAL
EOSINOPHIL # BLD: 0 K/UL (ref 0–0.4)
EOSINOPHIL NFR BLD: 0 % (ref 0–7)
ERYTHROCYTE [DISTWIDTH] IN BLOOD BY AUTOMATED COUNT: 15.6 % (ref 11.5–14.5)
GLOBULIN SER CALC-MCNC: 3.6 G/DL (ref 2–4)
GLUCOSE SERPL-MCNC: 107 MG/DL (ref 65–100)
GLUCOSE UR STRIP.AUTO-MCNC: NEGATIVE MG/DL
HCT VFR BLD AUTO: 39.4 % (ref 35–47)
HDLC SERPL-MCNC: 190 MG/DL
HDLC SERPL: 1.2 {RATIO} (ref 0–5)
HGB BLD-MCNC: 13.4 G/DL (ref 11.5–16)
HGB UR QL STRIP: NEGATIVE
IMM GRANULOCYTES # BLD AUTO: 0 K/UL (ref 0–0.04)
IMM GRANULOCYTES NFR BLD AUTO: 0 % (ref 0–0.5)
KETONES UR QL STRIP.AUTO: NEGATIVE MG/DL
LDLC SERPL CALC-MCNC: 16.6 MG/DL (ref 0–100)
LEUKOCYTE ESTERASE UR QL STRIP.AUTO: NEGATIVE
LYMPHOCYTES # BLD: 1.1 K/UL (ref 0.8–3.5)
LYMPHOCYTES NFR BLD: 25 % (ref 12–49)
MCH RBC QN AUTO: 32.5 PG (ref 26–34)
MCHC RBC AUTO-ENTMCNC: 34 G/DL (ref 30–36.5)
MCV RBC AUTO: 95.6 FL (ref 80–99)
MONOCYTES # BLD: 0.4 K/UL (ref 0–1)
MONOCYTES NFR BLD: 10 % (ref 5–13)
NEUTS SEG # BLD: 2.9 K/UL (ref 1.8–8)
NEUTS SEG NFR BLD: 65 % (ref 32–75)
NITRITE UR QL STRIP.AUTO: NEGATIVE
NRBC # BLD: 0 K/UL (ref 0–0.01)
NRBC BLD-RTO: 0 PER 100 WBC
PH UR STRIP: 5.5 [PH] (ref 5–8)
PLATELET # BLD AUTO: 258 K/UL (ref 150–400)
PMV BLD AUTO: 11.6 FL (ref 8.9–12.9)
POTASSIUM SERPL-SCNC: 4.4 MMOL/L (ref 3.5–5.1)
PROT SERPL-MCNC: 7.5 G/DL (ref 6.4–8.2)
PROT UR STRIP-MCNC: ABNORMAL MG/DL
RBC # BLD AUTO: 4.12 M/UL (ref 3.8–5.2)
SODIUM SERPL-SCNC: 139 MMOL/L (ref 136–145)
SP GR UR REFRACTOMETRY: 1.02 (ref 1–1.03)
TRIGL SERPL-MCNC: 82 MG/DL (ref ?–150)
TSH SERPL DL<=0.05 MIU/L-ACNC: 2.61 UIU/ML (ref 0.36–3.74)
URATE SERPL-MCNC: 1.9 MG/DL (ref 2.6–6)
UROBILINOGEN UR QL STRIP.AUTO: 0.2 EU/DL (ref 0.2–1)
VLDLC SERPL CALC-MCNC: 16.4 MG/DL
WBC # BLD AUTO: 4.5 K/UL (ref 3.6–11)

## 2022-06-02 LAB — APO B SERPL-MCNC: 59 MG/DL

## 2022-10-04 ENCOUNTER — OFFICE VISIT (OUTPATIENT)
Dept: INTERNAL MEDICINE CLINIC | Age: 82
End: 2022-10-04
Payer: MEDICARE

## 2022-10-04 VITALS
BODY MASS INDEX: 17.33 KG/M2 | DIASTOLIC BLOOD PRESSURE: 82 MMHG | OXYGEN SATURATION: 99 % | WEIGHT: 91.8 LBS | TEMPERATURE: 98.2 F | HEART RATE: 92 BPM | RESPIRATION RATE: 14 BRPM | HEIGHT: 61 IN | SYSTOLIC BLOOD PRESSURE: 146 MMHG

## 2022-10-04 DIAGNOSIS — E78.5 DYSLIPIDEMIA: ICD-10-CM

## 2022-10-04 DIAGNOSIS — J43.1 PANLOBULAR EMPHYSEMA (HCC): ICD-10-CM

## 2022-10-04 DIAGNOSIS — E03.2 HYPOTHYROIDISM DUE TO MEDICATION: ICD-10-CM

## 2022-10-04 DIAGNOSIS — I10 PRIMARY HYPERTENSION: Primary | ICD-10-CM

## 2022-10-04 PROCEDURE — G8754 DIAS BP LESS 90: HCPCS | Performed by: INTERNAL MEDICINE

## 2022-10-04 PROCEDURE — G8753 SYS BP > OR = 140: HCPCS | Performed by: INTERNAL MEDICINE

## 2022-10-04 PROCEDURE — G8399 PT W/DXA RESULTS DOCUMENT: HCPCS | Performed by: INTERNAL MEDICINE

## 2022-10-04 PROCEDURE — G8427 DOCREV CUR MEDS BY ELIG CLIN: HCPCS | Performed by: INTERNAL MEDICINE

## 2022-10-04 PROCEDURE — G8510 SCR DEP NEG, NO PLAN REQD: HCPCS | Performed by: INTERNAL MEDICINE

## 2022-10-04 PROCEDURE — 1090F PRES/ABSN URINE INCON ASSESS: CPT | Performed by: INTERNAL MEDICINE

## 2022-10-04 PROCEDURE — 1123F ACP DISCUSS/DSCN MKR DOCD: CPT | Performed by: INTERNAL MEDICINE

## 2022-10-04 PROCEDURE — 99214 OFFICE O/P EST MOD 30 MIN: CPT | Performed by: INTERNAL MEDICINE

## 2022-10-04 PROCEDURE — 1101F PT FALLS ASSESS-DOCD LE1/YR: CPT | Performed by: INTERNAL MEDICINE

## 2022-10-04 PROCEDURE — G8419 CALC BMI OUT NRM PARAM NOF/U: HCPCS | Performed by: INTERNAL MEDICINE

## 2022-10-04 PROCEDURE — G8536 NO DOC ELDER MAL SCRN: HCPCS | Performed by: INTERNAL MEDICINE

## 2022-10-04 NOTE — PROGRESS NOTES
Tiffany Rachel is a 79 y.o. female  Chief Complaint   Patient presents with    Follow-up    Hypertension     Patient here for follow up high blood pressure. 1. Have you been to the ER, urgent care clinic since your last visit? Hospitalized since your last visit? No    2. Have you seen or consulted any other health care providers outside of the [address] [place] [address] since your last visit? Include any pap smears or colon screening.  No

## 2022-10-04 NOTE — PROGRESS NOTES
[address] [facility]  [place] [address]  [address] [place] [address]  Phone:  [phone]  Fax: [phone]       Chief Complaint   Patient presents with    Follow-up    Hypertension     Patient here for follow up high blood pressure. .      SUBJECTIVE:    Anjali Gutierrez is a 79 y.o. female comes in for return visit stating that she has done well. Unfortunately she continues to smoke cigarettes. She does have existing COPD at her age of 79. She has a past history of primary hypertension, dyslipidemia and hypothyroidism. Her last TSH was excellent. Current Outpatient Medications   Medication Sig Dispense Refill    levothyroxine (SYNTHROID) 50 mcg tablet TAKE 1 TABLET ON AN EMPTY STOMACH IN THE MORNING ONCE A DAY ORALLY 90 Tablet 3    amLODIPine (NORVASC) 10 mg tablet TAKE 1 TABLET BY MOUTH EVERY DAY 90 Tablet 3    pravastatin (PRAVACHOL) 40 mg tablet TAKE 1 TABLET BY MOUTH EVERYDAY AT BEDTIME 90 Tablet 3    allopurinoL (ZYLOPRIM) 100 mg tablet TAKE 1 TABLET BY MOUTH EVERY DAY 90 Tablet 3    valsartan (DIOVAN) 160 mg tablet Take 1 Tablet by mouth daily.  90 Tablet 3    colchicine (MITIGARE) 0.6 mg capsule TAKE 1 TABLET BY MOUTH EVERY DAY ( NON FORM / NOT COVERED) (Patient not taking: No sig reported) 30 Cap 5     Past Medical History:   Diagnosis Date    Hypercholesterolemia     Hypertension     Thyroid disease      Past Surgical History:   Procedure Laterality Date    HX COLONOSCOPY      HX GYN       No Known Allergies      REVIEW OF SYSTEMS:  General: negative for - chills or fever  ENT: negative for - headaches, nasal congestion or tinnitus  Respiratory: negative for - cough, hemoptysis, shortness of breath or wheezing  Cardiovascular : negative for - chest pain, edema, palpitations or shortness of breath  Gastrointestinal: negative for - abdominal pain, blood in stools, heartburn or nausea/vomiting  Genito-Urinary: no dysuria, trouble voiding, or hematuria  Musculoskeletal: negative for - gait disturbance, joint pain, joint stiffness or joint swelling  Neurological: no TIA or stroke symptoms  Hematologic: no bruises, no bleeding, no swollen glands  Integument: no lumps, mole changes, nail changes or rash  Endocrine: no malaise/lethargy or unexpected weight changes      Social History     Socioeconomic History    Marital status: UNKNOWN    Number of children: 2   Occupational History    Occupation: retired--correction work   Tobacco Use    Smoking status: Every Day    Smokeless tobacco: Never   Vaping Use    Vaping Use: Never used   Substance and Sexual Activity    Alcohol use: No    Drug use: No    Sexual activity: Not Currently     Family History   Problem Relation Age of Onset    Cancer Paternal Grandfather        OBJECTIVE:    Visit Vitals  BP (!) 146/82   Pulse 92   Temp 98.2 °F (36.8 °C) (Oral)   Resp 14   Ht 5' 1\" (1.549 m)   Wt 91 lb 12.8 oz (41.6 kg)   SpO2 99%   BMI 17.35 kg/m²     CONSTITUTIONAL: well , well nourished, appears age appropriate  EYES: perrla, eom intact  ENMT:moist mucous membranes, pharynx clear  NECK: supple. Thyroid normal  RESPIRATORY: Chest: clear to ascultation and percussion   CARDIOVASCULAR: Heart: regular rate and rhythm  GASTROINTESTINAL: Abdomen: soft, bowel sounds active  HEMATOLOGIC: no pathological lymph nodes palpated  MUSCULOSKELETAL: Extremities: no edema, pulse 1+   INTEGUMENT: No unusual rashes or suspicious skin lesions noted. Nails appear normal.  NEUROLOGIC: non-focal exam   MENTAL STATUS: alert and oriented, appropriate affect      ASSESSMENT:  1. Primary hypertension    2. Hypothyroidism due to medication    3. Panlobular emphysema ([address]    4. Dyslipidemia        PLAN:  1. The patient's blood pressure is excellent today, no adjustments are made. 2. Her last TSH was excellent. No adjustments were made in her thyroid supplement. 3. I again encouraged her to discontinue her cigarette smoking.   She has existing COPD above and beyond the proatherogenic and pro-oncogenic aspect that exists. She is really not interested in stopping unfortunately. 4. She remains on her statin in view of her significantly increased cardiovascular risk particularly driven primarily by her age above and beyond her existing comorbidities. Orders Placed This Encounter    METABOLIC PANEL, BASIC         Follow-up and Dispositions    Return in about 3 months (around 1/4/2023).            Ivan Ghosh MD

## 2022-10-05 LAB
ANION GAP SERPL CALC-SCNC: 11 MMOL/L (ref 5–15)
BUN SERPL-MCNC: 13 MG/DL (ref 6–20)
BUN/CREAT SERPL: 10 (ref 12–20)
CALCIUM SERPL-MCNC: 9.4 MG/DL (ref 8.5–10.1)
CHLORIDE SERPL-SCNC: 110 MMOL/L (ref 97–108)
CO2 SERPL-SCNC: 21 MMOL/L (ref 21–32)
CREAT SERPL-MCNC: 1.32 MG/DL (ref 0.55–1.02)
GLUCOSE SERPL-MCNC: 92 MG/DL (ref 65–100)
POTASSIUM SERPL-SCNC: 4 MMOL/L (ref 3.5–5.1)
SODIUM SERPL-SCNC: 142 MMOL/L (ref 136–145)

## 2023-01-14 DIAGNOSIS — I10 PRIMARY HYPERTENSION: ICD-10-CM

## 2023-01-15 RX ORDER — VALSARTAN 160 MG/1
TABLET ORAL
Qty: 90 TABLET | Refills: 3 | Status: SHIPPED | OUTPATIENT
Start: 2023-01-15

## 2023-02-06 ENCOUNTER — OFFICE VISIT (OUTPATIENT)
Dept: INTERNAL MEDICINE CLINIC | Age: 83
End: 2023-02-06
Payer: MEDICARE

## 2023-02-06 VITALS
SYSTOLIC BLOOD PRESSURE: 118 MMHG | OXYGEN SATURATION: 100 % | TEMPERATURE: 97.2 F | HEART RATE: 88 BPM | DIASTOLIC BLOOD PRESSURE: 76 MMHG | BODY MASS INDEX: 17.55 KG/M2 | WEIGHT: 89.4 LBS | RESPIRATION RATE: 14 BRPM | HEIGHT: 60 IN

## 2023-02-06 DIAGNOSIS — E78.5 DYSLIPIDEMIA: ICD-10-CM

## 2023-02-06 DIAGNOSIS — Z00.00 WELLNESS EXAMINATION: ICD-10-CM

## 2023-02-06 DIAGNOSIS — R80.9 PROTEINURIA, UNSPECIFIED TYPE: ICD-10-CM

## 2023-02-06 DIAGNOSIS — Z13.31 SCREENING FOR DEPRESSION: ICD-10-CM

## 2023-02-06 DIAGNOSIS — J43.1 PANLOBULAR EMPHYSEMA (HCC): Primary | ICD-10-CM

## 2023-02-06 DIAGNOSIS — E03.2 HYPOTHYROIDISM DUE TO MEDICATION: ICD-10-CM

## 2023-02-06 DIAGNOSIS — I10 PRIMARY HYPERTENSION: ICD-10-CM

## 2023-02-06 NOTE — PROGRESS NOTES
Yasmine Cruz is a 79 y.o. female  Chief Complaint   Patient presents with    Annual Wellness Visit     Patient here for Annual Wellness Exam.      1. Have you been to the ER, urgent care clinic since your last visit? Hospitalized since your last visit? No    2. Have you seen or consulted any other health care providers outside of the [address] [place] [address] since your last visit? Include any pap smears or colon screening.  No

## 2023-02-06 NOTE — PROGRESS NOTES
[address] [place] [address] and [facility]  [place] [address]  [address]  [address] [address]  Phone:  [phone]  Fax: [phone]       Chief Complaint   Patient presents with    Annual Wellness Visit     Patient here for Annual Wellness Exam.    .      SUBJECTIVE:    Jodi Sheldon is a 79 y.o. female comes in for return visit stating that she is doing well. Unfortunately, she continues to smoke cigarettes. She denies any shortness of breath or coughing. She has a past history of primary hypertension, dyslipidemia, and hypothyroidism. She lives alone and is doing quite well at home by herself, although she is not gaining any meaningful weight.            Current Outpatient Medications   Medication Sig Dispense Refill    valsartan (DIOVAN) 160 mg tablet TAKE 1 TABLET BY MOUTH EVERY DAY 90 Tablet 3    levothyroxine (SYNTHROID) 50 mcg tablet TAKE 1 TABLET ON AN EMPTY STOMACH IN THE MORNING ONCE A DAY ORALLY 90 Tablet 3    amLODIPine (NORVASC) 10 mg tablet TAKE 1 TABLET BY MOUTH EVERY DAY 90 Tablet 3    pravastatin (PRAVACHOL) 40 mg tablet TAKE 1 TABLET BY MOUTH EVERYDAY AT BEDTIME 90 Tablet 3    allopurinoL (ZYLOPRIM) 100 mg tablet TAKE 1 TABLET BY MOUTH EVERY DAY 90 Tablet 3    colchicine (MITIGARE) 0.6 mg capsule TAKE 1 TABLET BY MOUTH EVERY DAY ( NON FORM / NOT COVERED) (Patient not taking: No sig reported) 30 Cap 5     Past Medical History:   Diagnosis Date    Hypercholesterolemia     Hypertension     Thyroid disease      Past Surgical History:   Procedure Laterality Date    HX COLONOSCOPY      HX GYN       No Known Allergies      REVIEW OF SYSTEMS:  General: negative for - chills or fever  ENT: negative for - headaches, nasal congestion or tinnitus  Respiratory: negative for - cough, hemoptysis, shortness of breath or wheezing  Cardiovascular : negative for - chest pain, edema, palpitations or shortness of breath  Gastrointestinal: negative for - abdominal pain, blood in stools, heartburn or nausea/vomiting  Genito-Urinary: no dysuria, trouble voiding, or hematuria  Musculoskeletal: negative for - gait disturbance, joint pain, joint stiffness or joint swelling  Neurological: no TIA or stroke symptoms  Hematologic: no bruises, no bleeding, no swollen glands  Integument: no lumps, mole changes, nail changes or rash  Endocrine: no malaise/lethargy or unexpected weight changes      Social History     Socioeconomic History    Marital status: UNKNOWN    Number of children: 2   Occupational History    Occupation: retired--[employer] work   Tobacco Use    Smoking status: Every Day    Smokeless tobacco: Never   Vaping Use    Vaping Use: Never used   Substance and Sexual Activity    Alcohol use: No    Drug use: No    Sexual activity: Not Currently     Family History   Problem Relation Age of Onset    Cancer Paternal Grandfather        OBJECTIVE:    Visit Vitals  /76   Pulse 88   Temp 97.2 °F (36.2 °C) (Oral)   Resp 14   Ht 5' (1.524 m)   Wt 89 lb 6.4 oz (40.6 kg)   SpO2 100%   BMI 17.46 kg/m²     CONSTITUTIONAL: well , well nourished, appears age appropriate  EYES: perrla, eom intact  ENMT:moist mucous membranes, pharynx clear  NECK: supple. Thyroid normal  RESPIRATORY: Chest: clear to ascultation and percussion   CARDIOVASCULAR: Heart: regular rate and rhythm  GASTROINTESTINAL: Abdomen: soft, bowel sounds active  HEMATOLOGIC: no pathological lymph nodes palpated  MUSCULOSKELETAL: Extremities: no edema, pulse 1+   INTEGUMENT: No unusual rashes or suspicious skin lesions noted. Nails appear normal.  NEUROLOGIC: non-focal exam   MENTAL STATUS: alert and oriented, appropriate affect      ASSESSMENT:  1. Panlobular emphysema ([address]    2. Primary hypertension    3. Hypothyroidism due to medication    4. Dyslipidemia        PLAN:  1. The patient does have COPD and I encouraged her to discontinue her cigarette smoking. 2. Blood pressure is excellent. No adjustments are made.   3. She will continue her thyroid supplement as prescribed. Her last TSH was excellent. 4. She has a significantly increased cardiovascular risk and remains on her statin. 5. I encouraged her to increase her physical activity and again hopefully to discontinue the cigarette smoking permanently. Orders Placed This Encounter    METABOLIC PANEL, Darlene Diaz MD   This is the Subsequent Medicare Annual Wellness Exam, performed 12 months or more after the Initial AWV or the last Subsequent AWV    I have reviewed the patient's medical history in detail and updated the computerized patient record. Assessment/Plan   Education and counseling provided:  Are appropriate based on today's review and evaluation    1. Panlobular emphysema ([facility]  2. Primary hypertension  -     METABOLIC PANEL, BASIC; Future  3. Hypothyroidism due to medication  4. Dyslipidemia  5. Proteinuria, unspecified type  -     CREATININE, UR, RANDOM; Future  -     PROTEIN URINE, RANDOM;  Future       Depression Risk Factor Screening     3 most recent PHQ Screens 2/6/2023   PHQ Not Done -   Little interest or pleasure in doing things Not at all   Feeling down, depressed, irritable, or hopeless Not at all   Total Score PHQ 2 0   Trouble falling or staying asleep, or sleeping too much Not at all   Feeling tired or having little energy Not at all   Poor appetite, weight loss, or overeating Not at all   Feeling bad about yourself - or that you are a failure or have let yourself or your family down Not at all   Trouble concentrating on things such as school, work, reading, or watching TV Not at all   Moving or speaking so slowly that other people could have noticed; or the opposite being so fidgety that others notice Not at all   Thoughts of being better off dead, or hurting yourself in some way Not at all   PHQ 9 Score 0   How difficult have these problems made it for you to do your work, take care of your home and get along with others Not difficult at all Alcohol & Drug Abuse Risk Screen    Do you average more than 1 drink per night or more than 7 drinks a week:  No    On any one occasion in the past three months have you have had more than 3 drinks containing alcohol:  No          Functional Ability and Level of Safety    Hearing: Hearing is good. Activities of Daily Living: The home contains: no safety equipment. Patient does total self care      Ambulation: with no difficulty     Fall Risk:  Fall Risk Assessment, last 12 mths 2/6/2023   Able to walk? Yes   Fall in past 12 months? 0   Do you feel unsteady? 0   Are you worried about falling 0   Fall with injury? -      Abuse Screen:  Patient is not abused       Cognitive Screening    Has your family/caregiver stated any concerns about your memory: no     Cognitive Screening: Not applicable.     Health Maintenance Due     Health Maintenance Due   Topic Date Due    COVID-19 Vaccine (1) Never done    Pneumococcal 65+ years (1 - PCV) Never done    Shingles Vaccine (1 of 2) Never done    Flu Vaccine (1) Never done       Patient Care Team   Patient Care Team:  Ronel Baker MD as PCP - General (Internal Medicine Physician)  Ronel Baker MD as PCP - [address] [place] [address] [facility]    History     Patient Active Problem List   Diagnosis Code    COPD (chronic obstructive pulmonary disease) ([facility] [address] J44.9    Hypertension I10    Gout M10.9    Low back pain M54.50    Dyslipidemia E78.5    Hypothyroidism E03.9    Prerenal azotemia R79.89     Past Medical History:   Diagnosis Date    Hypercholesterolemia     Hypertension     Thyroid disease       Past Surgical History:   Procedure Laterality Date    HX COLONOSCOPY      HX GYN       Current Outpatient Medications   Medication Sig Dispense Refill    valsartan (DIOVAN) 160 mg tablet TAKE 1 TABLET BY MOUTH EVERY DAY 90 Tablet 3    levothyroxine (SYNTHROID) 50 mcg tablet TAKE 1 TABLET ON AN EMPTY STOMACH IN THE MORNING ONCE A DAY ORALLY 90 Tablet 3    amLODIPine (NORVASC) 10 mg tablet TAKE 1 TABLET BY MOUTH EVERY DAY 90 Tablet 3    pravastatin (PRAVACHOL) 40 mg tablet TAKE 1 TABLET BY MOUTH EVERYDAY AT BEDTIME 90 Tablet 3    allopurinoL (ZYLOPRIM) 100 mg tablet TAKE 1 TABLET BY MOUTH EVERY DAY 90 Tablet 3    colchicine (MITIGARE) 0.6 mg capsule TAKE 1 TABLET BY MOUTH EVERY DAY ( NON FORM / NOT COVERED) (Patient not taking: No sig reported) 30 Cap 5     No Known Allergies    Family History   Problem Relation Age of Onset    Cancer Paternal Grandfather      Social History     Tobacco Use    Smoking status: Every Day    Smokeless tobacco: Never   Substance Use Topics    Alcohol use: No         Marko Vargas MD

## 2023-02-07 LAB
ANION GAP SERPL CALC-SCNC: 10 MMOL/L (ref 5–15)
BUN SERPL-MCNC: 14 MG/DL (ref 6–20)
BUN/CREAT SERPL: 11 (ref 12–20)
CALCIUM SERPL-MCNC: 9.5 MG/DL (ref 8.5–10.1)
CHLORIDE SERPL-SCNC: 115 MMOL/L (ref 97–108)
CO2 SERPL-SCNC: 16 MMOL/L (ref 21–32)
CREAT SERPL-MCNC: 1.24 MG/DL (ref 0.55–1.02)
CREAT UR-MCNC: 190 MG/DL
GLUCOSE SERPL-MCNC: 101 MG/DL (ref 65–100)
POTASSIUM SERPL-SCNC: 4.7 MMOL/L (ref 3.5–5.1)
PROT UR-MCNC: 44 MG/DL (ref 0–11.9)
SODIUM SERPL-SCNC: 141 MMOL/L (ref 136–145)

## 2023-02-12 NOTE — PATIENT INSTRUCTIONS
Medicare Wellness Visit, Female     The best way to live healthy is to have a lifestyle where you eat a well-balanced diet, exercise regularly, limit alcohol use, and quit all forms of tobacco/nicotine, if applicable. Regular preventive services are another way to keep healthy. Preventive services (vaccines, screening tests, monitoring & exams) can help personalize your care plan, which helps you manage your own care. Screening tests can find health problems at the earliest stages, when they are easiest to treat. [facility] follows the current, evidence-based guidelines published by the [facility] ([employer]) when recommending preventive services for our patients. Because we follow these guidelines, sometimes recommendations change over time as research supports it. (For example, mammograms used to be recommended annually. Even though Medicare will still pay for an annual mammogram, the newer guidelines recommend a mammogram every two years for women of average risk). Of course, you and your doctor may decide to screen more often for some diseases, based on your risk and your co-morbidities (chronic disease you are already diagnosed with). Preventive services for you include:  - Medicare offers their members a free annual wellness visit, which is time for you and your primary care provider to discuss and plan for your preventive service needs.  Take advantage of this benefit every year!    -Over the age of 72 should receive the recommended pneumonia vaccines.    -All adults should have a flu vaccine yearly.  -All adults should have a tetanus vaccine every 10 years.   -Over the age 48 should receive the shingles vaccines.        -All adults should be screened once for Hepatitis C.  -All adults age 38-68 who are overweight should have a diabetes screening test once every three years.   -Other screening tests and preventive services for persons with diabetes include: an eye exam to screen for diabetic retinopathy, a kidney function test, a foot exam, and stricter control over your cholesterol.   -Cardiovascular screening for adults with routine risk involves an electrocardiogram (ECG) at intervals determined by your doctor.     -Colorectal cancer screenings should be done for adults age 39-70 with no increased risk factors for colorectal cancer. There are a number of acceptable methods of screening for this type of cancer. Each test has its own benefits and drawbacks. Discuss with your doctor what is most appropriate for you during your annual wellness visit. The different tests include: colonoscopy (considered the best screening method), a fecal occult blood test, a fecal DNA test, and sigmoidoscopy.    -Lung cancer screening is recommended annually with a low dose CT scan for adults between age 54 and 68, who have smoked at least 30 pack years (equivalent of 1 pack per day for 30 days), and who is a current smoker or quit less than 15 years ago.    -A bone mass density test is recommended when a woman turns 65 to screen for osteoporosis. This test is only recommended one time, as a screening. Some providers will use this same test as a disease monitoring tool if you already have osteoporosis. -Breast cancer screenings are recommended every other year for women of normal risk, age 54-69.    -Cervical cancer screenings for women over age 72 are only recommended with certain risk factors.      Here is a list of your current Health Maintenance items (your personalized list of preventive services) with a due date:  Health Maintenance Due   Topic Date Due    COVID-19 Vaccine (1) Never done    Pneumococcal Vaccine (1 - PCV) Never done    Shingles Vaccine (1 of 2) Never done    Yearly Flu Vaccine (1) Never done Detail Level: Zone Products Recommended: And hats and covering skin General Sunscreen Counseling: I recommended a broad spectrum sunscreen with a SPF of 30 or higher.  I explained that SPF 30 sunscreens block approximately 97 percent of the sun's harmful rays.  Sunscreens should be applied at least 15 minutes prior to expected sun exposure and then every 2 hours after that as long as sun exposure continues. If swimming or exercising sunscreen should be reapplied every 45 minutes to an hour after getting wet or sweating.  One ounce, or the equivalent of a shot glass full of sunscreen, is adequate to protect the skin not covered by a bathing suit. I also recommended a lip balm with a sunscreen as well. Sun protective clothing can be used in lieu of sunscreen but must be worn the entire time you are exposed to the sun's rays.

## 2023-05-20 RX ORDER — AMLODIPINE BESYLATE 10 MG/1
1 TABLET ORAL DAILY
COMMUNITY
Start: 2022-07-15 | End: 2023-07-13

## 2023-05-20 RX ORDER — LEVOTHYROXINE SODIUM 0.05 MG/1
TABLET ORAL
COMMUNITY
Start: 2022-07-15 | End: 2023-07-13

## 2023-05-20 RX ORDER — ALLOPURINOL 100 MG/1
1 TABLET ORAL DAILY
COMMUNITY
Start: 2023-04-12

## 2023-05-20 RX ORDER — PRAVASTATIN SODIUM 40 MG
TABLET ORAL
COMMUNITY
Start: 2022-07-14 | End: 2023-07-13

## 2023-05-20 RX ORDER — VALSARTAN 160 MG/1
1 TABLET ORAL DAILY
COMMUNITY
Start: 2023-01-15

## 2023-05-20 RX ORDER — COLCHICINE 0.6 MG/1
CAPSULE ORAL
COMMUNITY
Start: 2019-03-14

## 2023-07-13 RX ORDER — LEVOTHYROXINE SODIUM 0.05 MG/1
TABLET ORAL
Qty: 90 TABLET | Refills: 3 | Status: SHIPPED | OUTPATIENT
Start: 2023-07-13

## 2023-07-13 RX ORDER — PRAVASTATIN SODIUM 40 MG
TABLET ORAL
Qty: 90 TABLET | Refills: 3 | Status: SHIPPED | OUTPATIENT
Start: 2023-07-13

## 2023-07-13 RX ORDER — AMLODIPINE BESYLATE 10 MG/1
TABLET ORAL
Qty: 90 TABLET | Refills: 3 | Status: SHIPPED | OUTPATIENT
Start: 2023-07-13

## 2023-08-07 ENCOUNTER — OFFICE VISIT (OUTPATIENT)
Facility: CLINIC | Age: 83
End: 2023-08-07

## 2023-08-07 VITALS
HEART RATE: 83 BPM | BODY MASS INDEX: 17.53 KG/M2 | SYSTOLIC BLOOD PRESSURE: 132 MMHG | HEIGHT: 60 IN | DIASTOLIC BLOOD PRESSURE: 76 MMHG | OXYGEN SATURATION: 99 % | TEMPERATURE: 97.5 F | WEIGHT: 89.3 LBS | RESPIRATION RATE: 18 BRPM

## 2023-08-07 DIAGNOSIS — R79.89 PRERENAL AZOTEMIA: ICD-10-CM

## 2023-08-07 DIAGNOSIS — I10 PRIMARY HYPERTENSION: Primary | ICD-10-CM

## 2023-08-07 DIAGNOSIS — E03.9 ACQUIRED HYPOTHYROIDISM: ICD-10-CM

## 2023-08-07 DIAGNOSIS — E78.5 DYSLIPIDEMIA: ICD-10-CM

## 2023-08-07 SDOH — ECONOMIC STABILITY: FOOD INSECURITY: WITHIN THE PAST 12 MONTHS, YOU WORRIED THAT YOUR FOOD WOULD RUN OUT BEFORE YOU GOT MONEY TO BUY MORE.: NEVER TRUE

## 2023-08-07 SDOH — ECONOMIC STABILITY: FOOD INSECURITY: WITHIN THE PAST 12 MONTHS, THE FOOD YOU BOUGHT JUST DIDN'T LAST AND YOU DIDN'T HAVE MONEY TO GET MORE.: NEVER TRUE

## 2023-08-07 SDOH — HEALTH STABILITY: PHYSICAL HEALTH: ON AVERAGE, HOW MANY DAYS PER WEEK DO YOU ENGAGE IN MODERATE TO STRENUOUS EXERCISE (LIKE A BRISK WALK)?: 0 DAYS

## 2023-08-07 SDOH — HEALTH STABILITY: PHYSICAL HEALTH: ON AVERAGE, HOW MANY MINUTES DO YOU ENGAGE IN EXERCISE AT THIS LEVEL?: 0 MIN

## 2023-08-07 SDOH — ECONOMIC STABILITY: HOUSING INSECURITY
IN THE LAST 12 MONTHS, WAS THERE A TIME WHEN YOU DID NOT HAVE A STEADY PLACE TO SLEEP OR SLEPT IN A SHELTER (INCLUDING NOW)?: NO

## 2023-08-07 SDOH — ECONOMIC STABILITY: TRANSPORTATION INSECURITY
IN THE PAST 12 MONTHS, HAS LACK OF TRANSPORTATION KEPT YOU FROM MEETINGS, WORK, OR FROM GETTING THINGS NEEDED FOR DAILY LIVING?: NO

## 2023-08-07 SDOH — ECONOMIC STABILITY: HOUSING INSECURITY: IN THE LAST 12 MONTHS, HOW MANY PLACES HAVE YOU LIVED?: 1

## 2023-08-07 SDOH — ECONOMIC STABILITY: INCOME INSECURITY: IN THE LAST 12 MONTHS, WAS THERE A TIME WHEN YOU WERE NOT ABLE TO PAY THE MORTGAGE OR RENT ON TIME?: NO

## 2023-08-07 SDOH — ECONOMIC STABILITY: TRANSPORTATION INSECURITY
IN THE PAST 12 MONTHS, HAS THE LACK OF TRANSPORTATION KEPT YOU FROM MEDICAL APPOINTMENTS OR FROM GETTING MEDICATIONS?: NO

## 2023-08-07 ASSESSMENT — ANXIETY QUESTIONNAIRES
1. FEELING NERVOUS, ANXIOUS, OR ON EDGE: 0
7. FEELING AFRAID AS IF SOMETHING AWFUL MIGHT HAPPEN: 0
GAD7 TOTAL SCORE: 0
3. WORRYING TOO MUCH ABOUT DIFFERENT THINGS: 0
6. BECOMING EASILY ANNOYED OR IRRITABLE: 0
4. TROUBLE RELAXING: 0
IF YOU CHECKED OFF ANY PROBLEMS ON THIS QUESTIONNAIRE, HOW DIFFICULT HAVE THESE PROBLEMS MADE IT FOR YOU TO DO YOUR WORK, TAKE CARE OF THINGS AT HOME, OR GET ALONG WITH OTHER PEOPLE: NOT DIFFICULT AT ALL
5. BEING SO RESTLESS THAT IT IS HARD TO SIT STILL: 0
2. NOT BEING ABLE TO STOP OR CONTROL WORRYING: 0

## 2023-08-07 ASSESSMENT — SOCIAL DETERMINANTS OF HEALTH (SDOH)
IN A TYPICAL WEEK, HOW MANY TIMES DO YOU TALK ON THE PHONE WITH FAMILY, FRIENDS, OR NEIGHBORS?: MORE THAN THREE TIMES A WEEK
WITHIN THE LAST YEAR, HAVE YOU BEEN AFRAID OF YOUR PARTNER OR EX-PARTNER?: NO
HOW OFTEN DO YOU ATTEND CHURCH OR RELIGIOUS SERVICES?: MORE THAN 4 TIMES PER YEAR
WITHIN THE LAST YEAR, HAVE YOU BEEN KICKED, HIT, SLAPPED, OR OTHERWISE PHYSICALLY HURT BY YOUR PARTNER OR EX-PARTNER?: NO
HOW HARD IS IT FOR YOU TO PAY FOR THE VERY BASICS LIKE FOOD, HOUSING, MEDICAL CARE, AND HEATING?: NOT HARD AT ALL
WITHIN THE LAST YEAR, HAVE TO BEEN RAPED OR FORCED TO HAVE ANY KIND OF SEXUAL ACTIVITY BY YOUR PARTNER OR EX-PARTNER?: NO
WITHIN THE LAST YEAR, HAVE YOU BEEN HUMILIATED OR EMOTIONALLY ABUSED IN OTHER WAYS BY YOUR PARTNER OR EX-PARTNER?: NO
HOW OFTEN DO YOU GET TOGETHER WITH FRIENDS OR RELATIVES?: MORE THAN THREE TIMES A WEEK
HOW OFTEN DO YOU ATTENT MEETINGS OF THE CLUB OR ORGANIZATION YOU BELONG TO?: MORE THAN 4 TIMES PER YEAR
DO YOU BELONG TO ANY CLUBS OR ORGANIZATIONS SUCH AS CHURCH GROUPS UNIONS, FRATERNAL OR ATHLETIC GROUPS, OR SCHOOL GROUPS?: YES

## 2023-08-07 ASSESSMENT — PATIENT HEALTH QUESTIONNAIRE - PHQ9
SUM OF ALL RESPONSES TO PHQ QUESTIONS 1-9: 0
2. FEELING DOWN, DEPRESSED OR HOPELESS: 0
1. LITTLE INTEREST OR PLEASURE IN DOING THINGS: 0
SUM OF ALL RESPONSES TO PHQ9 QUESTIONS 1 & 2: 0
SUM OF ALL RESPONSES TO PHQ QUESTIONS 1-9: 0

## 2023-08-07 ASSESSMENT — LIFESTYLE VARIABLES
HOW OFTEN DO YOU HAVE A DRINK CONTAINING ALCOHOL: NEVER
HOW MANY STANDARD DRINKS CONTAINING ALCOHOL DO YOU HAVE ON A TYPICAL DAY: PATIENT DOES NOT DRINK

## 2023-08-07 NOTE — PROGRESS NOTES
[facility]  [address] [address]  Phone:  [phone]  Fax: [phone]       Chief Complaint   Patient presents with    Follow-up    Hypertension     Patient here for follow up high blood pressure. .      SUBJECTIVE:    Dahlia Mckeon is a 79 y.o. female  Dictation on: 08/07/2023 11:36 AM by: Jayla Palomares [[id number]]          Current Outpatient Medications   Medication Sig Dispense Refill    pravastatin (PRAVACHOL) 40 MG tablet TAKE 1 TABLET BY MOUTH EVERYDAY AT BEDTIME 90 tablet 3    amLODIPine (NORVASC) 10 MG tablet TAKE 1 TABLET BY MOUTH EVERY DAY 90 tablet 3    levothyroxine (SYNTHROID) 50 MCG tablet TAKE 1 TABLET ON AN EMPTY STOMACH IN THE MORNING ONCE A DAY ORALLY 90 tablet 3    allopurinol (ZYLOPRIM) 100 MG tablet Take 1 tablet by mouth daily      colchicine (MITIGARE) 0.6 MG capsule TAKE 1 TABLET BY MOUTH EVERY DAY ( NON FORM / NOT COVERED)      valsartan (DIOVAN) 160 MG tablet Take 1 tablet by mouth daily       No current facility-administered medications for this visit.      Past Medical History:   Diagnosis Date    Hypercholesterolemia     Hypertension     Thyroid disease      Past Surgical History:   Procedure Laterality Date    COLONOSCOPY      GYN       No Known Allergies      REVIEW OF SYSTEMS:  General: negative for - chills or fever  ENT: negative for - headaches, nasal congestion or tinnitus  Respiratory: negative for - cough, hemoptysis, shortness of breath or wheezing  Cardiovascular : negative for - chest pain, edema, palpitations or shortness of breath  Gastrointestinal: negative for - abdominal pain, blood in stools, heartburn or nausea/vomiting  Genito-Urinary: no dysuria, trouble voiding, or hematuria  Musculoskeletal: negative for - gait disturbance, joint pain, joint stiffness or joint swelling  Neurological: no TIA or stroke symptoms  Hematologic: no bruises, no bleeding, no swollen glands  Integument: no lumps, mole

## 2023-08-08 LAB
ALBUMIN SERPL-MCNC: 3.4 G/DL (ref 3.5–5)
ALBUMIN/GLOB SERPL: 0.9 (ref 1.1–2.2)
ALP SERPL-CCNC: 104 U/L (ref 45–117)
ALT SERPL-CCNC: 20 U/L (ref 12–78)
ANION GAP SERPL CALC-SCNC: 7 MMOL/L (ref 5–15)
APPEARANCE UR: CLEAR
AST SERPL-CCNC: 20 U/L (ref 15–37)
BACTERIA URNS QL MICRO: NEGATIVE /HPF
BASOPHILS # BLD: 0 K/UL (ref 0–0.1)
BASOPHILS NFR BLD: 1 % (ref 0–1)
BILIRUB SERPL-MCNC: 0.3 MG/DL (ref 0.2–1)
BILIRUB UR QL: NEGATIVE
BUN SERPL-MCNC: 12 MG/DL (ref 6–20)
BUN/CREAT SERPL: 8 (ref 12–20)
CALCIUM SERPL-MCNC: 9.2 MG/DL (ref 8.5–10.1)
CHLORIDE SERPL-SCNC: 110 MMOL/L (ref 97–108)
CHOLEST SERPL-MCNC: 211 MG/DL
CO2 SERPL-SCNC: 20 MMOL/L (ref 21–32)
COLOR UR: NORMAL
CREAT SERPL-MCNC: 1.42 MG/DL (ref 0.55–1.02)
CRP SERPL HS-MCNC: 1.2 MG/L
DIFFERENTIAL METHOD BLD: ABNORMAL
EOSINOPHIL # BLD: 0 K/UL (ref 0–0.4)
EOSINOPHIL NFR BLD: 1 % (ref 0–7)
EPITH CASTS URNS QL MICRO: NORMAL /LPF
ERYTHROCYTE [DISTWIDTH] IN BLOOD BY AUTOMATED COUNT: 15.1 % (ref 11.5–14.5)
GLOBULIN SER CALC-MCNC: 3.6 G/DL (ref 2–4)
GLUCOSE SERPL-MCNC: 101 MG/DL (ref 65–100)
GLUCOSE UR STRIP.AUTO-MCNC: NEGATIVE MG/DL
HCT VFR BLD AUTO: 35.9 % (ref 35–47)
HDLC SERPL-MCNC: 151 MG/DL
HDLC SERPL: 1.4 (ref 0–5)
HGB BLD-MCNC: 12.1 G/DL (ref 11.5–16)
HGB UR QL STRIP: NEGATIVE
HYALINE CASTS URNS QL MICRO: NORMAL /LPF (ref 0–5)
IMM GRANULOCYTES # BLD AUTO: 0 K/UL (ref 0–0.04)
IMM GRANULOCYTES NFR BLD AUTO: 0 % (ref 0–0.5)
KETONES UR QL STRIP.AUTO: NEGATIVE MG/DL
LDLC SERPL CALC-MCNC: 46.2 MG/DL (ref 0–100)
LEUKOCYTE ESTERASE UR QL STRIP.AUTO: NEGATIVE
LYMPHOCYTES # BLD: 1.1 K/UL (ref 0.8–3.5)
LYMPHOCYTES NFR BLD: 28 % (ref 12–49)
MCH RBC QN AUTO: 32.9 PG (ref 26–34)
MCHC RBC AUTO-ENTMCNC: 33.7 G/DL (ref 30–36.5)
MCV RBC AUTO: 97.6 FL (ref 80–99)
MONOCYTES # BLD: 0.4 K/UL (ref 0–1)
MONOCYTES NFR BLD: 11 % (ref 5–13)
NEUTS SEG # BLD: 2.3 K/UL (ref 1.8–8)
NEUTS SEG NFR BLD: 59 % (ref 32–75)
NITRITE UR QL STRIP.AUTO: NEGATIVE
NRBC # BLD: 0 K/UL (ref 0–0.01)
NRBC BLD-RTO: 0 PER 100 WBC
PH UR STRIP: 5.5 (ref 5–8)
PLATELET # BLD AUTO: 263 K/UL (ref 150–400)
PMV BLD AUTO: 11.1 FL (ref 8.9–12.9)
POTASSIUM SERPL-SCNC: 5 MMOL/L (ref 3.5–5.1)
PROT SERPL-MCNC: 7 G/DL (ref 6.4–8.2)
PROT UR STRIP-MCNC: NEGATIVE MG/DL
RBC # BLD AUTO: 3.68 M/UL (ref 3.8–5.2)
RBC #/AREA URNS HPF: NORMAL /HPF (ref 0–5)
SODIUM SERPL-SCNC: 137 MMOL/L (ref 136–145)
SP GR UR REFRACTOMETRY: 1.01 (ref 1–1.03)
TRIGL SERPL-MCNC: 69 MG/DL
TSH SERPL DL<=0.05 MIU/L-ACNC: 3.47 UIU/ML (ref 0.36–3.74)
UROBILINOGEN UR QL STRIP.AUTO: 0.2 EU/DL (ref 0.2–1)
VLDLC SERPL CALC-MCNC: 13.8 MG/DL
WBC # BLD AUTO: 3.8 K/UL (ref 3.6–11)
WBC URNS QL MICRO: NORMAL /HPF (ref 0–4)

## 2023-08-08 NOTE — PROGRESS NOTES
comes in for return visit stating that she has done well. She has no complaints for the most part. She remains quite active. Unfortunately she continues to smoke cigarettes. She does have existing COPD, but this is not clinically significant. She has a past history of primary hypertension, dyslipidemia, and gout. Her gout has been quite stable with no flare ups in the last several years. She lives independently and is quite active generally.

## 2023-08-09 LAB — APO B SERPL-MCNC: 53 MG/DL

## 2024-01-02 ENCOUNTER — OFFICE VISIT (OUTPATIENT)
Facility: CLINIC | Age: 84
End: 2024-01-02
Payer: MEDICARE

## 2024-01-02 VITALS
WEIGHT: 78.9 LBS | BODY MASS INDEX: 15.49 KG/M2 | RESPIRATION RATE: 16 BRPM | HEART RATE: 98 BPM | TEMPERATURE: 98.2 F | HEIGHT: 60 IN | OXYGEN SATURATION: 99 % | SYSTOLIC BLOOD PRESSURE: 116 MMHG | DIASTOLIC BLOOD PRESSURE: 56 MMHG

## 2024-01-02 DIAGNOSIS — E78.5 DYSLIPIDEMIA: ICD-10-CM

## 2024-01-02 DIAGNOSIS — M10.9 GOUT, UNSPECIFIED CAUSE, UNSPECIFIED CHRONICITY, UNSPECIFIED SITE: ICD-10-CM

## 2024-01-02 DIAGNOSIS — R79.89 PRERENAL AZOTEMIA: ICD-10-CM

## 2024-01-02 DIAGNOSIS — I10 PRIMARY HYPERTENSION: ICD-10-CM

## 2024-01-02 DIAGNOSIS — Z00.00 MEDICARE ANNUAL WELLNESS VISIT, SUBSEQUENT: Primary | ICD-10-CM

## 2024-01-02 DIAGNOSIS — E89.0 POSTABLATIVE HYPOTHYROIDISM: ICD-10-CM

## 2024-01-02 DIAGNOSIS — J43.1 PANLOBULAR EMPHYSEMA (HCC): ICD-10-CM

## 2024-01-02 PROCEDURE — 3074F SYST BP LT 130 MM HG: CPT | Performed by: INTERNAL MEDICINE

## 2024-01-02 PROCEDURE — G8484 FLU IMMUNIZE NO ADMIN: HCPCS | Performed by: INTERNAL MEDICINE

## 2024-01-02 PROCEDURE — 3078F DIAST BP <80 MM HG: CPT | Performed by: INTERNAL MEDICINE

## 2024-01-02 PROCEDURE — 1123F ACP DISCUSS/DSCN MKR DOCD: CPT | Performed by: INTERNAL MEDICINE

## 2024-01-02 PROCEDURE — G0439 PPPS, SUBSEQ VISIT: HCPCS | Performed by: INTERNAL MEDICINE

## 2024-01-02 ASSESSMENT — PATIENT HEALTH QUESTIONNAIRE - PHQ9
SUM OF ALL RESPONSES TO PHQ QUESTIONS 1-9: 0
SUM OF ALL RESPONSES TO PHQ QUESTIONS 1-9: 0
1. LITTLE INTEREST OR PLEASURE IN DOING THINGS: 0
SUM OF ALL RESPONSES TO PHQ9 QUESTIONS 1 & 2: 0
SUM OF ALL RESPONSES TO PHQ QUESTIONS 1-9: 0
2. FEELING DOWN, DEPRESSED OR HOPELESS: 0
SUM OF ALL RESPONSES TO PHQ QUESTIONS 1-9: 0

## 2024-01-08 RX ORDER — ALLOPURINOL 100 MG/1
100 TABLET ORAL DAILY
Qty: 30 TABLET | Refills: 11 | Status: SHIPPED | OUTPATIENT
Start: 2024-01-08

## 2024-01-09 NOTE — PROGRESS NOTES
1. The patient has COPD and has attempted to make an effort to discontinue cigarette smoking.  She is 83 years of age with a BMI of 15 and the last thing she needs to do is smoke.  2. She has a mild prerenal azotemia, which improved significantly.  3. Her thyroid status is stable.  Last TSH was excellent.  4. Blood pressure is also within normal limits and no adjustments need be made.  5. She has a significant increased cardiovascular risk created by her age, as well as her existing comorbidities. She remains on her statin.  6. Fortunately, she is maintaining her weight, although she needs to gain, which will improve if she discontinues her cigarette smoking most likely.    
Chief Complaint   Patient presents with    Medicare AWV     \"Have you been to the ER, urgent care clinic since your last visit?  Hospitalized since your last visit?\"    NO    “Have you seen or consulted any other health care providers outside of [facility] since your last visit?”    NO         
Comes in for return visit, stating that she no longer feels dizzy when she stands.  On her last visit she had orthostatic hypotension.  I discontinued Amlodipine, which she has done, and her blood pressure has indeed improved.      She also developed upper respiratory symptoms over the last week, but she is much better now.  She was formerly coughing excessively, but not now, and she has not had any audible wheezing in the last several days. Her cough is productive of whitish mucus.  She denies any dyspnea on exertion.    She has a past history of hypothyroidism and dyslipidemia, as well as gout, all of which are stable.    
next 5-10 years is provided to the patient in written form: see Patient Instructions/AVS.     Return in 1 month (on 2/2/2024).     Subjective       Patient's complete Health Risk Assessment and screening values have been reviewed and are found in Flowsheets. The following problems were reviewed today and where indicated follow up appointments were made and/or referrals ordered.    Positive Risk Factor Screenings with Interventions:    Fall Risk:  Do you feel unsteady or are you worried about falling? : (!) yes  2 or more falls in past year?: no  Fall with injury in past year?: no     Interventions:    Reviewed medications, home hazards, visual acuity, and co-morbidities that can increase risk for falls             Activity, Diet, and Weight:  On average, how many days per week do you engage in moderate to strenuous exercise (like a brisk walk)?: 0 days  On average, how many minutes do you engage in exercise at this level?: 0 min    Do you eat balanced/healthy meals regularly?: (!) No    Body mass index is 15.41 kg/m². (!) Abnormal      Inactivity Interventions:  Will attempt to increase physical activity  Do you eat balanced/healthy meals regularly Interventions:  Will attempt to improve this aspect of her life  Underweight Interventions:  Discontinue cigarette smoking and this will improve          Vision Screen:  Do you have difficulty driving, watching TV, or doing any of your daily activities because of your eyesight?: No  Have you had an eye exam within the past year?: (!) No  No results found.    Interventions:    Pending    Safety:  Do you have non-slip mats or non-slip surfaces or shower bars or grab bars in your shower or bathtub?: (!) No  Interventions:  Patient declined any further interventions or treatment     Advanced Directives:  Do you have a Living Will?: (!) No    Intervention:          Tobacco Use:  Tobacco Use: High Risk (1/2/2024)    Patient History     Smoking Tobacco Use: Every Day

## 2024-01-10 DIAGNOSIS — I10 ESSENTIAL (PRIMARY) HYPERTENSION: ICD-10-CM

## 2024-01-10 RX ORDER — VALSARTAN 160 MG/1
160 TABLET ORAL DAILY
Qty: 90 TABLET | Refills: 3 | Status: SHIPPED | OUTPATIENT
Start: 2024-01-10

## 2024-03-07 ENCOUNTER — OFFICE VISIT (OUTPATIENT)
Facility: CLINIC | Age: 84
End: 2024-03-07
Payer: MEDICARE

## 2024-03-07 VITALS
SYSTOLIC BLOOD PRESSURE: 208 MMHG | TEMPERATURE: 98.2 F | HEART RATE: 72 BPM | HEIGHT: 61 IN | OXYGEN SATURATION: 98 % | RESPIRATION RATE: 16 BRPM | BODY MASS INDEX: 16.09 KG/M2 | DIASTOLIC BLOOD PRESSURE: 97 MMHG | WEIGHT: 85.2 LBS

## 2024-03-07 DIAGNOSIS — E89.0 POSTABLATIVE HYPOTHYROIDISM: ICD-10-CM

## 2024-03-07 DIAGNOSIS — E78.5 DYSLIPIDEMIA: ICD-10-CM

## 2024-03-07 DIAGNOSIS — R79.89 PRERENAL AZOTEMIA: ICD-10-CM

## 2024-03-07 DIAGNOSIS — J43.1 PANLOBULAR EMPHYSEMA (HCC): Primary | ICD-10-CM

## 2024-03-07 DIAGNOSIS — I10 PRIMARY HYPERTENSION: ICD-10-CM

## 2024-03-07 PROCEDURE — G8400 PT W/DXA NO RESULTS DOC: HCPCS | Performed by: INTERNAL MEDICINE

## 2024-03-07 PROCEDURE — G8427 DOCREV CUR MEDS BY ELIG CLIN: HCPCS | Performed by: INTERNAL MEDICINE

## 2024-03-07 PROCEDURE — 1123F ACP DISCUSS/DSCN MKR DOCD: CPT | Performed by: INTERNAL MEDICINE

## 2024-03-07 PROCEDURE — 1090F PRES/ABSN URINE INCON ASSESS: CPT | Performed by: INTERNAL MEDICINE

## 2024-03-07 PROCEDURE — 3023F SPIROM DOC REV: CPT | Performed by: INTERNAL MEDICINE

## 2024-03-07 PROCEDURE — 4004F PT TOBACCO SCREEN RCVD TLK: CPT | Performed by: INTERNAL MEDICINE

## 2024-03-07 PROCEDURE — 99214 OFFICE O/P EST MOD 30 MIN: CPT | Performed by: INTERNAL MEDICINE

## 2024-03-07 PROCEDURE — 3077F SYST BP >= 140 MM HG: CPT | Performed by: INTERNAL MEDICINE

## 2024-03-07 PROCEDURE — G8419 CALC BMI OUT NRM PARAM NOF/U: HCPCS | Performed by: INTERNAL MEDICINE

## 2024-03-07 PROCEDURE — 3080F DIAST BP >= 90 MM HG: CPT | Performed by: INTERNAL MEDICINE

## 2024-03-07 PROCEDURE — G8484 FLU IMMUNIZE NO ADMIN: HCPCS | Performed by: INTERNAL MEDICINE

## 2024-03-07 SDOH — ECONOMIC STABILITY: INCOME INSECURITY: HOW HARD IS IT FOR YOU TO PAY FOR THE VERY BASICS LIKE FOOD, HOUSING, MEDICAL CARE, AND HEATING?: NOT HARD AT ALL

## 2024-03-07 SDOH — ECONOMIC STABILITY: FOOD INSECURITY: WITHIN THE PAST 12 MONTHS, THE FOOD YOU BOUGHT JUST DIDN'T LAST AND YOU DIDN'T HAVE MONEY TO GET MORE.: NEVER TRUE

## 2024-03-07 SDOH — ECONOMIC STABILITY: FOOD INSECURITY: WITHIN THE PAST 12 MONTHS, YOU WORRIED THAT YOUR FOOD WOULD RUN OUT BEFORE YOU GOT MONEY TO BUY MORE.: NEVER TRUE

## 2024-03-07 ASSESSMENT — ANXIETY QUESTIONNAIRES
1. FEELING NERVOUS, ANXIOUS, OR ON EDGE: 0
IF YOU CHECKED OFF ANY PROBLEMS ON THIS QUESTIONNAIRE, HOW DIFFICULT HAVE THESE PROBLEMS MADE IT FOR YOU TO DO YOUR WORK, TAKE CARE OF THINGS AT HOME, OR GET ALONG WITH OTHER PEOPLE: NOT DIFFICULT AT ALL
4. TROUBLE RELAXING: 0
7. FEELING AFRAID AS IF SOMETHING AWFUL MIGHT HAPPEN: 0
6. BECOMING EASILY ANNOYED OR IRRITABLE: 0
2. NOT BEING ABLE TO STOP OR CONTROL WORRYING: 0
3. WORRYING TOO MUCH ABOUT DIFFERENT THINGS: 0
5. BEING SO RESTLESS THAT IT IS HARD TO SIT STILL: 0
GAD7 TOTAL SCORE: 0

## 2024-03-07 ASSESSMENT — PATIENT HEALTH QUESTIONNAIRE - PHQ9
SUM OF ALL RESPONSES TO PHQ QUESTIONS 1-9: 0
1. LITTLE INTEREST OR PLEASURE IN DOING THINGS: 0
SUM OF ALL RESPONSES TO PHQ QUESTIONS 1-9: 0
SUM OF ALL RESPONSES TO PHQ9 QUESTIONS 1 & 2: 0
SUM OF ALL RESPONSES TO PHQ QUESTIONS 1-9: 0
SUM OF ALL RESPONSES TO PHQ QUESTIONS 1-9: 0
2. FEELING DOWN, DEPRESSED OR HOPELESS: 0

## 2024-03-07 NOTE — PROGRESS NOTES
Chief Complaint   Patient presents with    Follow-up    Hypertension     \"Have you been to the ER, urgent care clinic since your last visit?  Hospitalized since your last visit?\"    NO    “Have you seen or consulted any other health care providers outside of [facility] since your last visit?”    NO

## 2024-04-01 ENCOUNTER — OFFICE VISIT (OUTPATIENT)
Facility: CLINIC | Age: 84
End: 2024-04-01
Payer: MEDICARE

## 2024-04-01 VITALS
WEIGHT: 88 LBS | SYSTOLIC BLOOD PRESSURE: 113 MMHG | BODY MASS INDEX: 16.62 KG/M2 | TEMPERATURE: 97.7 F | RESPIRATION RATE: 18 BRPM | OXYGEN SATURATION: 94 % | HEIGHT: 61 IN | HEART RATE: 85 BPM | DIASTOLIC BLOOD PRESSURE: 70 MMHG

## 2024-04-01 DIAGNOSIS — J43.1 PANLOBULAR EMPHYSEMA (HCC): ICD-10-CM

## 2024-04-01 DIAGNOSIS — E44.1 MILD PROTEIN-CALORIE MALNUTRITION (HCC): ICD-10-CM

## 2024-04-01 DIAGNOSIS — I10 PRIMARY HYPERTENSION: Primary | ICD-10-CM

## 2024-04-01 DIAGNOSIS — E78.5 DYSLIPIDEMIA: ICD-10-CM

## 2024-04-01 PROCEDURE — 1123F ACP DISCUSS/DSCN MKR DOCD: CPT | Performed by: INTERNAL MEDICINE

## 2024-04-01 PROCEDURE — 3078F DIAST BP <80 MM HG: CPT | Performed by: INTERNAL MEDICINE

## 2024-04-01 PROCEDURE — 1090F PRES/ABSN URINE INCON ASSESS: CPT | Performed by: INTERNAL MEDICINE

## 2024-04-01 PROCEDURE — 99214 OFFICE O/P EST MOD 30 MIN: CPT | Performed by: INTERNAL MEDICINE

## 2024-04-01 PROCEDURE — G8427 DOCREV CUR MEDS BY ELIG CLIN: HCPCS | Performed by: INTERNAL MEDICINE

## 2024-04-01 PROCEDURE — G8419 CALC BMI OUT NRM PARAM NOF/U: HCPCS | Performed by: INTERNAL MEDICINE

## 2024-04-01 PROCEDURE — 4004F PT TOBACCO SCREEN RCVD TLK: CPT | Performed by: INTERNAL MEDICINE

## 2024-04-01 PROCEDURE — 3023F SPIROM DOC REV: CPT | Performed by: INTERNAL MEDICINE

## 2024-04-01 PROCEDURE — G8400 PT W/DXA NO RESULTS DOC: HCPCS | Performed by: INTERNAL MEDICINE

## 2024-04-01 PROCEDURE — 3074F SYST BP LT 130 MM HG: CPT | Performed by: INTERNAL MEDICINE

## 2024-04-01 ASSESSMENT — PATIENT HEALTH QUESTIONNAIRE - PHQ9
SUM OF ALL RESPONSES TO PHQ QUESTIONS 1-9: 0
1. LITTLE INTEREST OR PLEASURE IN DOING THINGS: NOT AT ALL
2. FEELING DOWN, DEPRESSED OR HOPELESS: NOT AT ALL
SUM OF ALL RESPONSES TO PHQ QUESTIONS 1-9: 0
SUM OF ALL RESPONSES TO PHQ QUESTIONS 1-9: 0
SUM OF ALL RESPONSES TO PHQ9 QUESTIONS 1 & 2: 0
SUM OF ALL RESPONSES TO PHQ QUESTIONS 1-9: 0

## 2024-04-02 NOTE — PROGRESS NOTES
1. The patient's blood pressure is excellent.  No adjustments are made.  She has no orthostatic changes.  2. She does have existing COPD and continues to smoke cigarettes.  This is a factor with her caloric intake.  I encouraged her, as I have done for decades, to discontinue cigarette smoking.  3. She has an increased cardiovascular risk and remains on a statin as prescribed.  4. She has protein-calorie malnutrition and needs to increase her intake of processed carbohydrates, meats, preferably chicken, fish, and turkey.  Her appetite would improve if she discontinues cigarette smoking.    
Chief Complaint   Patient presents with    Cholesterol Problem    Hypertension     \"Have you been to the ER, urgent care clinic since your last visit?  Hospitalized since your last visit?\"    NO    “Have you seen or consulted any other health care providers outside of [facility] since your last visit?”    NO            Click Here for Release of Records Request  
comes in for return visit after resumption of her amlodipine 10 mg q.d.  She has not had any orthostatic dizziness or lightheadedness.  She is tolerating the medication quite well, which she has been on for years.    She has a past history of primary hypertension, dyslipidemia, and COPD.    Overall caloric intake is somewhat limited, and she is probably slightly malnourished.    
    Socioeconomic History    Marital status:      Spouse name: None    Number of children: 2    Years of education: 9    Highest education level: 9th grade   Tobacco Use    Smoking status: Every Day     Current packs/day: 0.10     Average packs/day: 0.1 packs/day for 54.4 years (5.4 ttl pk-yrs)     Types: Cigarettes     Start date: 11/1969    Smokeless tobacco: Never   Vaping Use    Vaping Use: Never used   Substance and Sexual Activity    Alcohol use: No    Drug use: No    Sexual activity: Not Currently     Partners: Male   Social History Narrative    Lives alone and has two adult sons.      Social Determinants of Health     Financial Resource Strain: Low Risk  (3/7/2024)    Overall Financial Resource Strain (CARDIA)     Difficulty of Paying Living Expenses: Not hard at all   Food Insecurity: No Food Insecurity (3/7/2024)    Hunger Vital Sign     Worried About Running Out of Food in the Last Year: Never true     Ran Out of Food in the Last Year: Never true   Transportation Needs: No Transportation Needs (3/7/2024)    PRAPARE - Transportation     Lack of Transportation (Medical): No     Lack of Transportation (Non-Medical): No   Physical Activity: Inactive (1/2/2024)    Exercise Vital Sign     Days of Exercise per Week: 0 days     Minutes of Exercise per Session: 0 min   Stress: No Stress Concern Present (8/7/2023)    Citizen of [place] of Occupational Health - Occupational Stress Questionnaire     Feeling of Stress : Not at all   Social Connections: Moderately Integrated (8/7/2023)    Social Connection and Isolation Panel [NHANES]     Frequency of Communication with Friends and Family: More than three times a week     Frequency of Social Gatherings with Friends and Family: More than three times a week     Attends [employer] Services: More than 4 times per year     Active Member of Clubs or Organizations: Yes     Attends Club or Organization Meetings: More than 4 times per year     Marital Status:

## 2024-06-06 ENCOUNTER — OFFICE VISIT (OUTPATIENT)
Facility: CLINIC | Age: 84
End: 2024-06-06
Payer: MEDICARE

## 2024-06-06 VITALS
OXYGEN SATURATION: 97 % | BODY MASS INDEX: 17.08 KG/M2 | HEART RATE: 88 BPM | SYSTOLIC BLOOD PRESSURE: 96 MMHG | HEIGHT: 60 IN | WEIGHT: 87 LBS | DIASTOLIC BLOOD PRESSURE: 59 MMHG | TEMPERATURE: 98.3 F | RESPIRATION RATE: 16 BRPM

## 2024-06-06 DIAGNOSIS — I95.9 HYPOTENSION, UNSPECIFIED HYPOTENSION TYPE: Primary | ICD-10-CM

## 2024-06-06 DIAGNOSIS — J43.1 PANLOBULAR EMPHYSEMA (HCC): ICD-10-CM

## 2024-06-06 DIAGNOSIS — E78.5 DYSLIPIDEMIA: ICD-10-CM

## 2024-06-06 DIAGNOSIS — R79.89 PRERENAL AZOTEMIA: ICD-10-CM

## 2024-06-06 DIAGNOSIS — E89.0 POSTABLATIVE HYPOTHYROIDISM: ICD-10-CM

## 2024-06-06 PROCEDURE — 3074F SYST BP LT 130 MM HG: CPT | Performed by: INTERNAL MEDICINE

## 2024-06-06 PROCEDURE — 1090F PRES/ABSN URINE INCON ASSESS: CPT | Performed by: INTERNAL MEDICINE

## 2024-06-06 PROCEDURE — 1036F TOBACCO NON-USER: CPT | Performed by: INTERNAL MEDICINE

## 2024-06-06 PROCEDURE — 3023F SPIROM DOC REV: CPT | Performed by: INTERNAL MEDICINE

## 2024-06-06 PROCEDURE — G8400 PT W/DXA NO RESULTS DOC: HCPCS | Performed by: INTERNAL MEDICINE

## 2024-06-06 PROCEDURE — G8419 CALC BMI OUT NRM PARAM NOF/U: HCPCS | Performed by: INTERNAL MEDICINE

## 2024-06-06 PROCEDURE — 3078F DIAST BP <80 MM HG: CPT | Performed by: INTERNAL MEDICINE

## 2024-06-06 PROCEDURE — 1123F ACP DISCUSS/DSCN MKR DOCD: CPT | Performed by: INTERNAL MEDICINE

## 2024-06-06 PROCEDURE — 99214 OFFICE O/P EST MOD 30 MIN: CPT | Performed by: INTERNAL MEDICINE

## 2024-06-06 PROCEDURE — G8427 DOCREV CUR MEDS BY ELIG CLIN: HCPCS | Performed by: INTERNAL MEDICINE

## 2024-06-06 RX ORDER — AMLODIPINE BESYLATE 5 MG/1
5 TABLET ORAL DAILY
Qty: 30 TABLET | Refills: 4 | Status: SHIPPED | OUTPATIENT
Start: 2024-06-06

## 2024-06-06 SDOH — ECONOMIC STABILITY: FOOD INSECURITY: WITHIN THE PAST 12 MONTHS, YOU WORRIED THAT YOUR FOOD WOULD RUN OUT BEFORE YOU GOT MONEY TO BUY MORE.: NEVER TRUE

## 2024-06-06 SDOH — ECONOMIC STABILITY: FOOD INSECURITY: WITHIN THE PAST 12 MONTHS, THE FOOD YOU BOUGHT JUST DIDN'T LAST AND YOU DIDN'T HAVE MONEY TO GET MORE.: NEVER TRUE

## 2024-06-06 SDOH — ECONOMIC STABILITY: INCOME INSECURITY: HOW HARD IS IT FOR YOU TO PAY FOR THE VERY BASICS LIKE FOOD, HOUSING, MEDICAL CARE, AND HEATING?: NOT HARD AT ALL

## 2024-06-06 ASSESSMENT — ANXIETY QUESTIONNAIRES
5. BEING SO RESTLESS THAT IT IS HARD TO SIT STILL: NOT AT ALL
7. FEELING AFRAID AS IF SOMETHING AWFUL MIGHT HAPPEN: NOT AT ALL
IF YOU CHECKED OFF ANY PROBLEMS ON THIS QUESTIONNAIRE, HOW DIFFICULT HAVE THESE PROBLEMS MADE IT FOR YOU TO DO YOUR WORK, TAKE CARE OF THINGS AT HOME, OR GET ALONG WITH OTHER PEOPLE: NOT DIFFICULT AT ALL
6. BECOMING EASILY ANNOYED OR IRRITABLE: NOT AT ALL
3. WORRYING TOO MUCH ABOUT DIFFERENT THINGS: NOT AT ALL
GAD7 TOTAL SCORE: 0
1. FEELING NERVOUS, ANXIOUS, OR ON EDGE: NOT AT ALL
4. TROUBLE RELAXING: NOT AT ALL
2. NOT BEING ABLE TO STOP OR CONTROL WORRYING: NOT AT ALL

## 2024-06-06 ASSESSMENT — PATIENT HEALTH QUESTIONNAIRE - PHQ9
SUM OF ALL RESPONSES TO PHQ9 QUESTIONS 1 & 2: 0
SUM OF ALL RESPONSES TO PHQ QUESTIONS 1-9: 0
2. FEELING DOWN, DEPRESSED OR HOPELESS: NOT AT ALL
1. LITTLE INTEREST OR PLEASURE IN DOING THINGS: NOT AT ALL

## 2024-06-06 NOTE — PROGRESS NOTES
Chief Complaint   Patient presents with    Follow-up    Hypertension     \"Have you been to the ER, urgent care clinic since your last visit?  Hospitalized since your last visit?\"    NO    “Have you seen or consulted any other health care providers outside of [facility] since your last visit?”    NO            Click Here for Release of Records Request

## 2024-06-06 NOTE — PROGRESS NOTES
[facility]  [address] [place] [address]  [address]  [facility] [address]  Phone:  [phone]  Fax: [phone]       Chief Complaint   Patient presents with    Follow-up    Hypertension   .      SUBJECTIVE:    Verenice Hoff is a 83 y.o. female  Dictation on: 06/06/2024 11:39 AM by: DANIEL BOWMAN [[id number]]          Current Outpatient Medications   Medication Sig Dispense Refill    amLODIPine (NORVASC) 5 MG tablet Take 1 tablet by mouth daily 30 tablet 4    valsartan (DIOVAN) 160 MG tablet TAKE 1 TABLET BY MOUTH EVERY DAY 90 tablet 3    allopurinol (ZYLOPRIM) 100 MG tablet Take 1 tablet by mouth daily 30 tablet 11    pravastatin (PRAVACHOL) 40 MG tablet TAKE 1 TABLET BY MOUTH EVERYDAY AT BEDTIME 90 tablet 3    levothyroxine (SYNTHROID) 50 MCG tablet TAKE 1 TABLET ON AN EMPTY STOMACH IN THE MORNING ONCE A DAY ORALLY 90 tablet 3     No current facility-administered medications for this visit.     Past Medical History:   Diagnosis Date    Hypercholesterolemia     Hypertension     Thyroid disease      Past Surgical History:   Procedure Laterality Date    COLONOSCOPY      GYN       No Known Allergies      REVIEW OF SYSTEMS:  General: negative for - chills or fever  ENT: negative for - headaches, nasal congestion or tinnitus  Respiratory: negative for - cough, hemoptysis, shortness of breath or wheezing  Cardiovascular : negative for - chest pain, edema, palpitations or shortness of breath  Gastrointestinal: negative for - abdominal pain, blood in stools, heartburn or nausea/vomiting  Genito-Urinary: no dysuria, trouble voiding, or hematuria  Musculoskeletal: negative for - gait disturbance, joint pain, joint stiffness or joint swelling  Neurological: no TIA or stroke symptoms  Hematologic: no bruises, no bleeding, no swollen glands  Integument: no lumps, mole changes, nail changes or rash  Endocrine: no malaise/lethargy or unexpected weight changes      Social History     Socioeconomic History

## 2024-06-07 NOTE — PROGRESS NOTES
1. The patient's blood pressure is low and I will therefore reduce her Amlodipine to 5 mg q.d.  When I completely discontinued the Amlodipine, her blood pressure went up to almost the 300 range.    The Amlodipine, as I stated, will be reduced to 5 mg.    2. I encouraged her to maintain a reasonable degree of hydration.  3. She does have existing COPD and unfortunately continues to smoke cigarettes.  I emphasized the importance of discontinuing this.  4. Her last TSH was excellent.  5. She remains on a statin in view of her high cardiovascular risk.

## 2024-06-07 NOTE — PROGRESS NOTES
Comes in saying that she has done well.  She has not lost any weight since I last saw her.  She is back on her Amlodipine.  It was fine on her last visit.  She complains of intermittent dizziness, although no syncope or vertiginous component is present.    Her walking is a bit slow, but she is not that physically active.    She has a past history of primary hypertension, dyslipidemia, hypothyroidism, as well as COPD.  Unfortunately, she continues to smoke cigarettes.

## 2024-07-03 RX ORDER — AMLODIPINE BESYLATE 10 MG/1
TABLET ORAL
Qty: 90 TABLET | Refills: 3 | Status: SHIPPED | OUTPATIENT
Start: 2024-07-03

## 2024-07-03 RX ORDER — PRAVASTATIN SODIUM 40 MG
TABLET ORAL
Qty: 90 TABLET | Refills: 3 | Status: SHIPPED | OUTPATIENT
Start: 2024-07-03

## 2024-07-03 RX ORDER — LEVOTHYROXINE SODIUM 0.05 MG/1
50 TABLET ORAL EVERY MORNING
Qty: 90 TABLET | Refills: 3 | Status: SHIPPED | OUTPATIENT
Start: 2024-07-03

## 2024-07-08 ENCOUNTER — OFFICE VISIT (OUTPATIENT)
Facility: CLINIC | Age: 84
End: 2024-07-08
Payer: MEDICARE

## 2024-07-08 VITALS
OXYGEN SATURATION: 100 % | TEMPERATURE: 98.5 F | HEART RATE: 87 BPM | DIASTOLIC BLOOD PRESSURE: 79 MMHG | WEIGHT: 80.3 LBS | RESPIRATION RATE: 16 BRPM | HEIGHT: 60 IN | BODY MASS INDEX: 15.76 KG/M2 | SYSTOLIC BLOOD PRESSURE: 143 MMHG

## 2024-07-08 DIAGNOSIS — E78.5 DYSLIPIDEMIA: ICD-10-CM

## 2024-07-08 DIAGNOSIS — D17.1 LIPOMA OF TORSO: ICD-10-CM

## 2024-07-08 DIAGNOSIS — J43.1 PANLOBULAR EMPHYSEMA (HCC): ICD-10-CM

## 2024-07-08 DIAGNOSIS — E89.0 POSTABLATIVE HYPOTHYROIDISM: ICD-10-CM

## 2024-07-08 DIAGNOSIS — R63.4 WEIGHT LOSS: Primary | ICD-10-CM

## 2024-07-08 DIAGNOSIS — R59.1 LYMPHADENOPATHY OF HEAD AND NECK: ICD-10-CM

## 2024-07-08 PROCEDURE — 1090F PRES/ABSN URINE INCON ASSESS: CPT | Performed by: INTERNAL MEDICINE

## 2024-07-08 PROCEDURE — 3023F SPIROM DOC REV: CPT | Performed by: INTERNAL MEDICINE

## 2024-07-08 PROCEDURE — G8427 DOCREV CUR MEDS BY ELIG CLIN: HCPCS | Performed by: INTERNAL MEDICINE

## 2024-07-08 PROCEDURE — 1036F TOBACCO NON-USER: CPT | Performed by: INTERNAL MEDICINE

## 2024-07-08 PROCEDURE — G8419 CALC BMI OUT NRM PARAM NOF/U: HCPCS | Performed by: INTERNAL MEDICINE

## 2024-07-08 PROCEDURE — G8400 PT W/DXA NO RESULTS DOC: HCPCS | Performed by: INTERNAL MEDICINE

## 2024-07-08 PROCEDURE — 1123F ACP DISCUSS/DSCN MKR DOCD: CPT | Performed by: INTERNAL MEDICINE

## 2024-07-08 PROCEDURE — 3077F SYST BP >= 140 MM HG: CPT | Performed by: INTERNAL MEDICINE

## 2024-07-08 PROCEDURE — 36415 COLL VENOUS BLD VENIPUNCTURE: CPT | Performed by: INTERNAL MEDICINE

## 2024-07-08 PROCEDURE — 3078F DIAST BP <80 MM HG: CPT | Performed by: INTERNAL MEDICINE

## 2024-07-08 PROCEDURE — 99214 OFFICE O/P EST MOD 30 MIN: CPT | Performed by: INTERNAL MEDICINE

## 2024-07-08 SDOH — ECONOMIC STABILITY: FOOD INSECURITY: WITHIN THE PAST 12 MONTHS, THE FOOD YOU BOUGHT JUST DIDN'T LAST AND YOU DIDN'T HAVE MONEY TO GET MORE.: NEVER TRUE

## 2024-07-08 SDOH — ECONOMIC STABILITY: FOOD INSECURITY: WITHIN THE PAST 12 MONTHS, YOU WORRIED THAT YOUR FOOD WOULD RUN OUT BEFORE YOU GOT MONEY TO BUY MORE.: NEVER TRUE

## 2024-07-08 SDOH — ECONOMIC STABILITY: INCOME INSECURITY: HOW HARD IS IT FOR YOU TO PAY FOR THE VERY BASICS LIKE FOOD, HOUSING, MEDICAL CARE, AND HEATING?: NOT HARD AT ALL

## 2024-07-08 ASSESSMENT — ANXIETY QUESTIONNAIRES
5. BEING SO RESTLESS THAT IT IS HARD TO SIT STILL: NOT AT ALL
6. BECOMING EASILY ANNOYED OR IRRITABLE: NOT AT ALL
IF YOU CHECKED OFF ANY PROBLEMS ON THIS QUESTIONNAIRE, HOW DIFFICULT HAVE THESE PROBLEMS MADE IT FOR YOU TO DO YOUR WORK, TAKE CARE OF THINGS AT HOME, OR GET ALONG WITH OTHER PEOPLE: NOT DIFFICULT AT ALL
2. NOT BEING ABLE TO STOP OR CONTROL WORRYING: NOT AT ALL
4. TROUBLE RELAXING: NOT AT ALL
1. FEELING NERVOUS, ANXIOUS, OR ON EDGE: NOT AT ALL
GAD7 TOTAL SCORE: 0
3. WORRYING TOO MUCH ABOUT DIFFERENT THINGS: NOT AT ALL
7. FEELING AFRAID AS IF SOMETHING AWFUL MIGHT HAPPEN: NOT AT ALL

## 2024-07-08 ASSESSMENT — PATIENT HEALTH QUESTIONNAIRE - PHQ9
SUM OF ALL RESPONSES TO PHQ QUESTIONS 1-9: 0
SUM OF ALL RESPONSES TO PHQ QUESTIONS 1-9: 0
2. FEELING DOWN, DEPRESSED OR HOPELESS: NOT AT ALL
SUM OF ALL RESPONSES TO PHQ QUESTIONS 1-9: 0
SUM OF ALL RESPONSES TO PHQ QUESTIONS 1-9: 0
SUM OF ALL RESPONSES TO PHQ9 QUESTIONS 1 & 2: 0
1. LITTLE INTEREST OR PLEASURE IN DOING THINGS: NOT AT ALL

## 2024-07-08 NOTE — PROGRESS NOTES
[facility]  [address] [place] [address]  [address]  [facility] [address]  Phone:  [phone]  Fax: [phone]       Chief Complaint   Patient presents with    Follow-up    Hypertension   .      SUBJECTIVE:    Verenice Hoff is a 83 y.o. female  Dictation on: 07/08/2024 12:39 PM by: DANIEL BOWMAN [[id number]]          Current Outpatient Medications   Medication Sig Dispense Refill    pravastatin (PRAVACHOL) 40 MG tablet TAKE 1 TABLET BY MOUTH EVERYDAY AT BEDTIME 90 tablet 3    amLODIPine (NORVASC) 10 MG tablet TAKE 1 TABLET BY MOUTH EVERY DAY 90 tablet 3    levothyroxine (SYNTHROID) 50 MCG tablet TAKE 1 TABLET BY MOUTH EVERY MORNING ON AN EMPTY STOMACH 90 tablet 3    amLODIPine (NORVASC) 5 MG tablet Take 1 tablet by mouth daily 30 tablet 4    valsartan (DIOVAN) 160 MG tablet TAKE 1 TABLET BY MOUTH EVERY DAY 90 tablet 3    allopurinol (ZYLOPRIM) 100 MG tablet Take 1 tablet by mouth daily 30 tablet 11     No current facility-administered medications for this visit.     Past Medical History:   Diagnosis Date    Hypercholesterolemia     Hypertension     Thyroid disease      Past Surgical History:   Procedure Laterality Date    COLONOSCOPY      GYN       No Known Allergies      REVIEW OF SYSTEMS:  General: negative for - chills or fever  ENT: negative for - headaches, nasal congestion or tinnitus  Respiratory: negative for - cough, hemoptysis, shortness of breath or wheezing  Cardiovascular : negative for - chest pain, edema, palpitations or shortness of breath  Gastrointestinal: negative for - abdominal pain, blood in stools, heartburn or nausea/vomiting  Genito-Urinary: no dysuria, trouble voiding, or hematuria  Musculoskeletal: negative for - gait disturbance, joint pain, joint stiffness or joint swelling  Neurological: no TIA or stroke symptoms  Hematologic: no bruises, no bleeding, no swollen glands  Integument: no lumps, mole changes, nail changes or rash  Endocrine: no malaise/lethargy or

## 2024-07-08 NOTE — PROGRESS NOTES
Chief Complaint   Patient presents with    Follow-up    Hypertension     \"Have you been to the ER, urgent care clinic since your last visit?  Hospitalized since your last visit?\"    NO    “Have you seen or consulted any other health care providers outside of [facility] since your last visit?”    NO            Click Here for Release of Records Request

## 2024-07-09 LAB
ALBUMIN SERPL-MCNC: 3.2 G/DL (ref 3.5–5)
ALBUMIN/GLOB SERPL: 1 (ref 1.1–2.2)
ALP SERPL-CCNC: 145 U/L (ref 45–117)
ALT SERPL-CCNC: 16 U/L (ref 12–78)
ANION GAP SERPL CALC-SCNC: 11 MMOL/L (ref 5–15)
APPEARANCE UR: CLEAR
AST SERPL-CCNC: 19 U/L (ref 15–37)
BACTERIA URNS QL MICRO: ABNORMAL /HPF
BASOPHILS # BLD: 0 K/UL (ref 0–0.1)
BASOPHILS NFR BLD: 0 % (ref 0–1)
BILIRUB SERPL-MCNC: 0.7 MG/DL (ref 0.2–1)
BILIRUB UR QL: NEGATIVE
BUN SERPL-MCNC: 16 MG/DL (ref 6–20)
BUN/CREAT SERPL: 12 (ref 12–20)
CALCIUM SERPL-MCNC: 8.8 MG/DL (ref 8.5–10.1)
CHLORIDE SERPL-SCNC: 111 MMOL/L (ref 97–108)
CHOLEST SERPL-MCNC: 209 MG/DL
CO2 SERPL-SCNC: 20 MMOL/L (ref 21–32)
COLOR UR: ABNORMAL
CREAT SERPL-MCNC: 1.29 MG/DL (ref 0.55–1.02)
DIFFERENTIAL METHOD BLD: ABNORMAL
EOSINOPHIL # BLD: 0 K/UL (ref 0–0.4)
EOSINOPHIL NFR BLD: 0 % (ref 0–7)
EPITH CASTS URNS QL MICRO: ABNORMAL /LPF
ERYTHROCYTE [DISTWIDTH] IN BLOOD BY AUTOMATED COUNT: 15.8 % (ref 11.5–14.5)
GLOBULIN SER CALC-MCNC: 3.3 G/DL (ref 2–4)
GLUCOSE SERPL-MCNC: 106 MG/DL (ref 65–100)
GLUCOSE UR STRIP.AUTO-MCNC: NEGATIVE MG/DL
HCT VFR BLD AUTO: 34.4 % (ref 35–47)
HDLC SERPL-MCNC: 155 MG/DL
HDLC SERPL: 1.3 (ref 0–5)
HGB BLD-MCNC: 11.5 G/DL (ref 11.5–16)
HGB UR QL STRIP: NEGATIVE
IMM GRANULOCYTES # BLD AUTO: 0 K/UL (ref 0–0.04)
IMM GRANULOCYTES NFR BLD AUTO: 1 % (ref 0–0.5)
KETONES UR QL STRIP.AUTO: NEGATIVE MG/DL
LDLC SERPL CALC-MCNC: 42 MG/DL (ref 0–100)
LEUKOCYTE ESTERASE UR QL STRIP.AUTO: NEGATIVE
LYMPHOCYTES # BLD: 1.1 K/UL (ref 0.8–3.5)
LYMPHOCYTES NFR BLD: 20 % (ref 12–49)
MCH RBC QN AUTO: 33.6 PG (ref 26–34)
MCHC RBC AUTO-ENTMCNC: 33.4 G/DL (ref 30–36.5)
MCV RBC AUTO: 100.6 FL (ref 80–99)
MONOCYTES # BLD: 0.6 K/UL (ref 0–1)
MONOCYTES NFR BLD: 12 % (ref 5–13)
NEUTS SEG # BLD: 3.5 K/UL (ref 1.8–8)
NEUTS SEG NFR BLD: 67 % (ref 32–75)
NITRITE UR QL STRIP.AUTO: NEGATIVE
NRBC # BLD: 0 K/UL (ref 0–0.01)
NRBC BLD-RTO: 0 PER 100 WBC
PH UR STRIP: 6 (ref 5–8)
PLATELET # BLD AUTO: 197 K/UL (ref 150–400)
PMV BLD AUTO: 12.6 FL (ref 8.9–12.9)
POTASSIUM SERPL-SCNC: 3.3 MMOL/L (ref 3.5–5.1)
PROT SERPL-MCNC: 6.5 G/DL (ref 6.4–8.2)
PROT UR STRIP-MCNC: 30 MG/DL
RBC # BLD AUTO: 3.42 M/UL (ref 3.8–5.2)
RBC #/AREA URNS HPF: ABNORMAL /HPF (ref 0–5)
SODIUM SERPL-SCNC: 142 MMOL/L (ref 136–145)
SP GR UR REFRACTOMETRY: 1.02 (ref 1–1.03)
TRIGL SERPL-MCNC: 60 MG/DL
TSH SERPL DL<=0.05 MIU/L-ACNC: 2.7 UIU/ML (ref 0.36–3.74)
UROBILINOGEN UR QL STRIP.AUTO: 0.2 EU/DL (ref 0.2–1)
VLDLC SERPL CALC-MCNC: 12 MG/DL
WBC # BLD AUTO: 5.2 K/UL (ref 3.6–11)
WBC URNS QL MICRO: ABNORMAL /HPF (ref 0–4)

## 2024-07-09 NOTE — PROGRESS NOTES
Comes in for return visit.  She has lost 8 pounds since her last visit.  This weight loss is pathological.  Her caloric intake is fair.  She does have a long history of cigarette smoking and does have existing COPD.  She is taking her thyroid medication as prescribed.    She has a past history of primary hypertension and dyslipidemia, as well as gout.

## 2024-07-17 NOTE — PROGRESS NOTES
1. The patient continues to lose weight.  I am not entirely sure of the etiology.  He is at a point now where more aggressive evaluation needs to occur.  Therefore CT scan of her chest, abdomen and pelvis will be obtained.  2. He does have existing COPD.  I will await the results of the CT scan but more importantly she definitely needs to discontinue cigarette smoking.  3. She has post ablative hypothyroidism and efficacy and compliance will be assessed with a TSH.  4. She has a significant increased cardiovascular risk and remains on a statin.

## 2024-08-19 ENCOUNTER — OFFICE VISIT (OUTPATIENT)
Facility: CLINIC | Age: 84
End: 2024-08-19
Payer: MEDICARE

## 2024-08-19 VITALS
DIASTOLIC BLOOD PRESSURE: 70 MMHG | TEMPERATURE: 97.7 F | SYSTOLIC BLOOD PRESSURE: 110 MMHG | WEIGHT: 79.6 LBS | HEART RATE: 87 BPM | RESPIRATION RATE: 16 BRPM | BODY MASS INDEX: 15.63 KG/M2 | OXYGEN SATURATION: 93 % | HEIGHT: 60 IN

## 2024-08-19 DIAGNOSIS — E78.5 DYSLIPIDEMIA: ICD-10-CM

## 2024-08-19 DIAGNOSIS — I10 PRIMARY HYPERTENSION: Primary | ICD-10-CM

## 2024-08-19 DIAGNOSIS — E89.0 POSTABLATIVE HYPOTHYROIDISM: ICD-10-CM

## 2024-08-19 DIAGNOSIS — R79.89 PRERENAL AZOTEMIA: ICD-10-CM

## 2024-08-19 DIAGNOSIS — J43.1 PANLOBULAR EMPHYSEMA (HCC): ICD-10-CM

## 2024-08-19 LAB
ANION GAP SERPL CALC-SCNC: 8 MMOL/L (ref 5–15)
BUN SERPL-MCNC: 15 MG/DL (ref 6–20)
BUN/CREAT SERPL: 12 (ref 12–20)
CALCIUM SERPL-MCNC: 9.4 MG/DL (ref 8.5–10.1)
CHLORIDE SERPL-SCNC: 111 MMOL/L (ref 97–108)
CO2 SERPL-SCNC: 24 MMOL/L (ref 21–32)
CREAT SERPL-MCNC: 1.23 MG/DL (ref 0.55–1.02)
GLUCOSE SERPL-MCNC: 123 MG/DL (ref 65–100)
POTASSIUM SERPL-SCNC: 3.7 MMOL/L (ref 3.5–5.1)
SODIUM SERPL-SCNC: 143 MMOL/L (ref 136–145)

## 2024-08-19 PROCEDURE — 1123F ACP DISCUSS/DSCN MKR DOCD: CPT | Performed by: INTERNAL MEDICINE

## 2024-08-19 PROCEDURE — G8400 PT W/DXA NO RESULTS DOC: HCPCS | Performed by: INTERNAL MEDICINE

## 2024-08-19 PROCEDURE — G8419 CALC BMI OUT NRM PARAM NOF/U: HCPCS | Performed by: INTERNAL MEDICINE

## 2024-08-19 PROCEDURE — 3023F SPIROM DOC REV: CPT | Performed by: INTERNAL MEDICINE

## 2024-08-19 PROCEDURE — G8427 DOCREV CUR MEDS BY ELIG CLIN: HCPCS | Performed by: INTERNAL MEDICINE

## 2024-08-19 PROCEDURE — 1090F PRES/ABSN URINE INCON ASSESS: CPT | Performed by: INTERNAL MEDICINE

## 2024-08-19 PROCEDURE — 99214 OFFICE O/P EST MOD 30 MIN: CPT | Performed by: INTERNAL MEDICINE

## 2024-08-19 PROCEDURE — 1036F TOBACCO NON-USER: CPT | Performed by: INTERNAL MEDICINE

## 2024-08-19 PROCEDURE — 3074F SYST BP LT 130 MM HG: CPT | Performed by: INTERNAL MEDICINE

## 2024-08-19 PROCEDURE — 3078F DIAST BP <80 MM HG: CPT | Performed by: INTERNAL MEDICINE

## 2024-08-19 NOTE — PROGRESS NOTES
Chief Complaint   Patient presents with    1 Month Follow-Up       \"Have you been to the ER, urgent care clinic since your last visit?  Hospitalized since your last visit?\"    NO    “Have you seen or consulted any other health care providers outside of [facility] since your last visit?”    NO            Click Here for Release of Records Request

## 2024-08-20 NOTE — PROGRESS NOTES
Comes in for a return visit stating that she is doing well. She states that she is eating better, although her weight is about the same.    On her last visit her potassium was low and I am not entirely sure why, primarily because she is not taking a diuretic.      She is taking her antihypertensive medication as prescribed, specifically Amlodipine, and she is not having any side effects.    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

## 2024-09-04 RX ORDER — AMLODIPINE BESYLATE 5 MG/1
5 TABLET ORAL DAILY
Qty: 90 TABLET | Refills: 3 | Status: SHIPPED | OUTPATIENT
Start: 2024-09-04

## 2024-10-30 ENCOUNTER — OFFICE VISIT (OUTPATIENT)
Facility: CLINIC | Age: 84
End: 2024-10-30

## 2024-10-30 VITALS — RESPIRATION RATE: 16 BRPM | BODY MASS INDEX: 15.9 KG/M2 | WEIGHT: 81 LBS | HEIGHT: 60 IN

## 2024-10-30 DIAGNOSIS — E78.5 DYSLIPIDEMIA: ICD-10-CM

## 2024-10-30 DIAGNOSIS — I10 PRIMARY HYPERTENSION: ICD-10-CM

## 2024-10-30 DIAGNOSIS — R79.89 PRERENAL AZOTEMIA: ICD-10-CM

## 2024-10-30 DIAGNOSIS — J43.1 PANLOBULAR EMPHYSEMA (HCC): Primary | ICD-10-CM

## 2024-10-30 ASSESSMENT — PATIENT HEALTH QUESTIONNAIRE - PHQ9
SUM OF ALL RESPONSES TO PHQ9 QUESTIONS 1 & 2: 0
1. LITTLE INTEREST OR PLEASURE IN DOING THINGS: NOT AT ALL
2. FEELING DOWN, DEPRESSED OR HOPELESS: NOT AT ALL
SUM OF ALL RESPONSES TO PHQ QUESTIONS 1-9: 0

## 2024-10-30 NOTE — PROGRESS NOTES
Chief Complaint   Patient presents with    Hypertension     Patient is here for follow up.      \"Have you been to the ER, urgent care clinic since your last visit?  Hospitalized since your last visit?\"    NO    “Have you seen or consulted any other health care providers outside our system since your last visit?”    NO

## 2024-11-05 NOTE — PROGRESS NOTES
Bronchoscopy Procedure Note     Preop diagnosis: Abnormal CT Chest    Postop diagnosis: Same    Pre-procedure: Informed consent was obtained and documented. A time out was performed. Vital signs, laboratory studies, chest imaging, medications, and allergies were reviewed. Continuous cardiopulmonary monitoring and supplemental oxygen were provided. The posterior oral pharynx was anesthetized with lidocaine gargle and nebulizer. Procedure: A bronchoscope was passed through the mouth. The oropharynx and larynx were visualized and transversed without difficulty and the bronchoscope was passed through the vocal cords. 2 cc aliquots of 1% lidocaine were applied as needed to the larynx and tracheobronchial tree. Sedation was acheived with MAC. Total time for moderate sedation was 15 minutes. Airway: Inspection showed a normal trachea, sharp tristen, and otherwise normal anatomy of the left and right tracheobronchial trees to the subsegmental level. BAL location: RML lobe, posterior segment  Amount instilled: 100 cc  Amount returned: 35 cc    The sample was sent for cell count with diff, gram stain and culture, AFB smear and culture, fungal smear and culture and cytology. Post-procedure: The patient tolerated the procedural well without immediate complications. Lia Perez.  DO [facility]  Pulmonary & Critical Care Medicine    [unfilled]  10:25 AM Comes in for return visit, saying that she has done reasonably well. Her appetite has improved somewhat.    Unfortunately, she continues to smoke cigarettes.    She has a past history of COPD, primary hypertension, as well as dyslipidemia and a prerenal azotemia.    Historically there is a problem with her blood pressure dropping to the point where I have to discontinue the antihypertensive medication, only to restart it later once her pressure returns back to baseline.

## 2024-11-05 NOTE — PROGRESS NOTES
[facility]  [address] [place] [address]  [address]  [facility] [address]  Phone:  [phone]  Fax: [phone]       Chief Complaint   Patient presents with    Hypertension     Patient is here for follow up.    .      SUBJECTIVE:    Verenice Hoff is a 84 y.o. female  Dictation on: 11/04/2024  8:51 PM by: DANIEL BOWMAN [[id number]]          Current Outpatient Medications   Medication Sig Dispense Refill    amLODIPine (NORVASC) 5 MG tablet TAKE 1 TABLET BY MOUTH EVERY DAY 90 tablet 3    pravastatin (PRAVACHOL) 40 MG tablet TAKE 1 TABLET BY MOUTH EVERYDAY AT BEDTIME 90 tablet 3    amLODIPine (NORVASC) 10 MG tablet TAKE 1 TABLET BY MOUTH EVERY DAY 90 tablet 3    levothyroxine (SYNTHROID) 50 MCG tablet TAKE 1 TABLET BY MOUTH EVERY MORNING ON AN EMPTY STOMACH 90 tablet 3    valsartan (DIOVAN) 160 MG tablet TAKE 1 TABLET BY MOUTH EVERY DAY 90 tablet 3    allopurinol (ZYLOPRIM) 100 MG tablet Take 1 tablet by mouth daily 30 tablet 11     No current facility-administered medications for this visit.     Past Medical History:   Diagnosis Date    Hypercholesterolemia     Hypertension     Thyroid disease      Past Surgical History:   Procedure Laterality Date    COLONOSCOPY      GYN       No Known Allergies      REVIEW OF SYSTEMS:  General: negative for - chills or fever  ENT: negative for - headaches, nasal congestion or tinnitus  Respiratory: negative for - cough, hemoptysis, shortness of breath or wheezing  Cardiovascular : negative for - chest pain, edema, palpitations or shortness of breath  Gastrointestinal: negative for - abdominal pain, blood in stools, heartburn or nausea/vomiting  Genito-Urinary: no dysuria, trouble voiding, or hematuria  Musculoskeletal: negative for - gait disturbance, joint pain, joint stiffness or joint swelling  Neurological: no TIA or stroke symptoms  Hematologic: no bruises, no bleeding, no swollen glands  Integument: no lumps, mole changes, nail changes or rash  Endocrine:

## 2024-11-05 NOTE — PROGRESS NOTES
1. The patient does have existing COPD and I again strongly advised her to discontinue cigarette smoking.  2. Blood pressure is acceptable, no adjustment is made.  3. She has a significant cardiovascular risk and remains on a statin.  4. She has a history of a prerenal azotemia.  I encouraged her to increase her PO intake to drink more liquids.

## 2024-11-06 DIAGNOSIS — R22.2 MASS IN CHEST: Primary | ICD-10-CM

## 2025-01-03 DIAGNOSIS — I10 ESSENTIAL (PRIMARY) HYPERTENSION: ICD-10-CM

## 2025-01-03 DIAGNOSIS — M10.9 GOUT, UNSPECIFIED CAUSE, UNSPECIFIED CHRONICITY, UNSPECIFIED SITE: ICD-10-CM

## 2025-01-03 RX ORDER — ALLOPURINOL 100 MG/1
100 TABLET ORAL DAILY
Qty: 90 TABLET | Refills: 3 | Status: SHIPPED | OUTPATIENT
Start: 2025-01-03

## 2025-01-03 RX ORDER — VALSARTAN 160 MG/1
160 TABLET ORAL DAILY
Qty: 90 TABLET | Refills: 3 | Status: SHIPPED | OUTPATIENT
Start: 2025-01-03